# Patient Record
Sex: MALE | Race: WHITE | Employment: OTHER | URBAN - METROPOLITAN AREA
[De-identification: names, ages, dates, MRNs, and addresses within clinical notes are randomized per-mention and may not be internally consistent; named-entity substitution may affect disease eponyms.]

---

## 2017-02-01 RX ORDER — PRAVASTATIN SODIUM 40 MG
40 TABLET ORAL DAILY
Qty: 90 TABLET | Refills: 2 | Status: SHIPPED | OUTPATIENT
Start: 2017-02-01 | End: 2018-11-01

## 2017-02-01 RX ORDER — CLOPIDOGREL BISULFATE 75 MG/1
75 TABLET ORAL DAILY
Qty: 90 TABLET | Refills: 2 | Status: SHIPPED | OUTPATIENT
Start: 2017-02-01 | End: 2017-11-14 | Stop reason: SDUPTHER

## 2017-04-20 RX ORDER — ZOLPIDEM TARTRATE 10 MG/1
TABLET ORAL
OUTPATIENT
Start: 2017-04-20

## 2017-05-01 ENCOUNTER — OFFICE VISIT (OUTPATIENT)
Dept: INTERNAL MEDICINE | Age: 77
End: 2017-05-01
Payer: MEDICARE

## 2017-05-01 VITALS
RESPIRATION RATE: 12 BRPM | HEIGHT: 62 IN | WEIGHT: 161.6 LBS | HEART RATE: 83 BPM | DIASTOLIC BLOOD PRESSURE: 75 MMHG | SYSTOLIC BLOOD PRESSURE: 127 MMHG | OXYGEN SATURATION: 98 % | BODY MASS INDEX: 29.74 KG/M2

## 2017-05-01 DIAGNOSIS — M25.512 ACUTE PAIN OF LEFT SHOULDER: Primary | ICD-10-CM

## 2017-05-01 PROCEDURE — 99213 OFFICE O/P EST LOW 20 MIN: CPT | Performed by: INTERNAL MEDICINE

## 2017-05-01 RX ORDER — HYDROCODONE BITARTRATE AND ACETAMINOPHEN 5; 325 MG/1; MG/1
1 TABLET ORAL EVERY 6 HOURS PRN
Qty: 28 TABLET | Refills: 0 | Status: SHIPPED | OUTPATIENT
Start: 2017-05-01 | End: 2017-05-08

## 2017-05-01 ASSESSMENT — PATIENT HEALTH QUESTIONNAIRE - PHQ9
SUM OF ALL RESPONSES TO PHQ9 QUESTIONS 1 & 2: 0
SUM OF ALL RESPONSES TO PHQ QUESTIONS 1-9: 0
2. FEELING DOWN, DEPRESSED OR HOPELESS: 0
1. LITTLE INTEREST OR PLEASURE IN DOING THINGS: 0

## 2017-05-02 ENCOUNTER — TELEPHONE (OUTPATIENT)
Dept: ORTHOPEDIC SURGERY | Age: 77
End: 2017-05-02

## 2017-05-05 ENCOUNTER — TELEPHONE (OUTPATIENT)
Dept: ORTHOPEDIC SURGERY | Age: 77
End: 2017-05-05

## 2017-05-09 ENCOUNTER — TELEPHONE (OUTPATIENT)
Dept: ORTHOPEDIC SURGERY | Age: 77
End: 2017-05-09

## 2017-06-12 RX ORDER — LOSARTAN POTASSIUM 25 MG/1
TABLET ORAL
Qty: 90 TABLET | Refills: 1 | Status: SHIPPED | OUTPATIENT
Start: 2017-06-12 | End: 2017-12-13 | Stop reason: SDUPTHER

## 2017-06-28 RX ORDER — FUROSEMIDE 40 MG/1
40 TABLET ORAL DAILY
Qty: 30 TABLET | Refills: 0 | Status: SHIPPED | OUTPATIENT
Start: 2017-06-28 | End: 2017-07-31 | Stop reason: SDUPTHER

## 2017-07-11 DIAGNOSIS — M25.512 LEFT SHOULDER PAIN, UNSPECIFIED CHRONICITY: Primary | ICD-10-CM

## 2017-07-14 ENCOUNTER — OFFICE VISIT (OUTPATIENT)
Dept: ORTHOPEDIC SURGERY | Age: 77
End: 2017-07-14
Payer: MEDICARE

## 2017-07-14 VITALS — BODY MASS INDEX: 29.44 KG/M2 | HEIGHT: 62 IN | WEIGHT: 160 LBS

## 2017-07-14 DIAGNOSIS — M75.102 TEAR OF LEFT ROTATOR CUFF, UNSPECIFIED TEAR EXTENT: ICD-10-CM

## 2017-07-14 DIAGNOSIS — M25.512 LEFT SHOULDER PAIN, UNSPECIFIED CHRONICITY: Primary | ICD-10-CM

## 2017-07-14 DIAGNOSIS — M67.432 GANGLION OF WRIST, LEFT: ICD-10-CM

## 2017-07-14 PROCEDURE — 99214 OFFICE O/P EST MOD 30 MIN: CPT | Performed by: ORTHOPAEDIC SURGERY

## 2017-07-14 PROCEDURE — 20610 DRAIN/INJ JOINT/BURSA W/O US: CPT | Performed by: ORTHOPAEDIC SURGERY

## 2017-07-14 RX ORDER — METHYLPREDNISOLONE ACETATE 80 MG/ML
80 INJECTION, SUSPENSION INTRA-ARTICULAR; INTRALESIONAL; INTRAMUSCULAR; SOFT TISSUE ONCE
Status: COMPLETED | OUTPATIENT
Start: 2017-07-14 | End: 2017-07-14

## 2017-07-14 RX ORDER — BUPIVACAINE HYDROCHLORIDE 2.5 MG/ML
2 INJECTION, SOLUTION INFILTRATION; PERINEURAL ONCE
Status: COMPLETED | OUTPATIENT
Start: 2017-07-14 | End: 2017-07-14

## 2017-07-14 RX ADMIN — BUPIVACAINE HYDROCHLORIDE 5 MG: 2.5 INJECTION, SOLUTION INFILTRATION; PERINEURAL at 12:46

## 2017-07-14 RX ADMIN — METHYLPREDNISOLONE ACETATE 80 MG: 80 INJECTION, SUSPENSION INTRA-ARTICULAR; INTRALESIONAL; INTRAMUSCULAR; SOFT TISSUE at 12:47

## 2017-07-14 ASSESSMENT — ENCOUNTER SYMPTOMS
RECTAL PAIN: 0
ABDOMINAL PAIN: 0
ANAL BLEEDING: 0
COLOR CHANGE: 0
VOMITING: 0
CONSTIPATION: 0
BACK PAIN: 0
DIARRHEA: 0
NAUSEA: 0
COUGH: 0
BLOOD IN STOOL: 0
WHEEZING: 0

## 2017-08-01 RX ORDER — FUROSEMIDE 40 MG/1
TABLET ORAL
Qty: 30 TABLET | Refills: 3 | Status: SHIPPED | OUTPATIENT
Start: 2017-08-01 | End: 2017-08-01 | Stop reason: SDUPTHER

## 2017-08-01 RX ORDER — FUROSEMIDE 40 MG/1
TABLET ORAL
Qty: 90 TABLET | Refills: 3 | Status: SHIPPED | OUTPATIENT
Start: 2017-08-01 | End: 2018-09-19 | Stop reason: SDUPTHER

## 2017-11-14 RX ORDER — CLOPIDOGREL BISULFATE 75 MG/1
TABLET ORAL
Qty: 90 TABLET | Refills: 2 | Status: SHIPPED | OUTPATIENT
Start: 2017-11-14 | End: 2018-08-27 | Stop reason: SDUPTHER

## 2017-11-14 NOTE — TELEPHONE ENCOUNTER
Next Visit Date:  No future appointments.     Health Maintenance   Topic Date Due    Lipid screen  07/18/1978    Zostavax vaccine  07/18/1998    Pneumococcal low/med risk (1 of 2 - PCV13) 07/18/2003    Flu vaccine (1) 09/01/2017    DTaP/Tdap/Td vaccine (1 - Tdap) 05/01/2018 (Originally 7/18/1959)       No results found for: LABA1C          ( goal A1C is < 7)   No results found for: LABMICR  No results found for: LDLCHOLESTEROL, LDLCALC    (goal LDL is <100)   BUN (mg/dL)   Date Value   09/27/2016 11     BP Readings from Last 3 Encounters:   05/01/17 127/75   11/03/16 120/80   09/27/16 117/60          (goal 120/80)    All Future Testing planned in [facility]  Lab Frequency Next Occurrence               Patient Active Problem List:     Shoulder pain, left     Mass of right submandibular region     Essential hypertension     Acute pain of left shoulder

## 2017-12-13 RX ORDER — LOSARTAN POTASSIUM 25 MG/1
TABLET ORAL
Qty: 90 TABLET | Refills: 3 | Status: SHIPPED | OUTPATIENT
Start: 2017-12-13 | End: 2018-11-01

## 2018-08-03 ENCOUNTER — OFFICE VISIT (OUTPATIENT)
Dept: INTERNAL MEDICINE | Age: 78
End: 2018-08-03
Payer: MEDICARE

## 2018-08-03 VITALS
TEMPERATURE: 98.3 F | HEART RATE: 49 BPM | WEIGHT: 154 LBS | SYSTOLIC BLOOD PRESSURE: 127 MMHG | DIASTOLIC BLOOD PRESSURE: 65 MMHG | HEIGHT: 62 IN | BODY MASS INDEX: 28.34 KG/M2

## 2018-08-03 DIAGNOSIS — K40.90 INGUINAL HERNIA, LEFT: Primary | ICD-10-CM

## 2018-08-03 PROCEDURE — 99214 OFFICE O/P EST MOD 30 MIN: CPT | Performed by: INTERNAL MEDICINE

## 2018-08-03 ASSESSMENT — PATIENT HEALTH QUESTIONNAIRE - PHQ9
SUM OF ALL RESPONSES TO PHQ9 QUESTIONS 1 & 2: 0
1. LITTLE INTEREST OR PLEASURE IN DOING THINGS: 0
SUM OF ALL RESPONSES TO PHQ QUESTIONS 1-9: 0
2. FEELING DOWN, DEPRESSED OR HOPELESS: 0

## 2018-08-28 RX ORDER — CLOPIDOGREL BISULFATE 75 MG/1
TABLET ORAL
Qty: 90 TABLET | Refills: 2 | Status: SHIPPED | OUTPATIENT
Start: 2018-08-28 | End: 2018-11-01

## 2018-09-11 ENCOUNTER — HOSPITAL ENCOUNTER (EMERGENCY)
Age: 78
Discharge: HOME | End: 2018-09-11
Attending: EMERGENCY MEDICINE
Payer: MEDICARE

## 2018-09-11 ENCOUNTER — APPOINTMENT (OUTPATIENT)
Dept: CT IMAGING | Age: 78
End: 2018-09-11
Payer: MEDICARE

## 2018-09-11 VITALS
BODY MASS INDEX: 27.41 KG/M2 | OXYGEN SATURATION: 96 % | HEART RATE: 90 BPM | WEIGHT: 154.7 LBS | RESPIRATION RATE: 16 BRPM | TEMPERATURE: 97.7 F | SYSTOLIC BLOOD PRESSURE: 141 MMHG | DIASTOLIC BLOOD PRESSURE: 54 MMHG | HEIGHT: 63 IN

## 2018-09-11 DIAGNOSIS — R19.09 MASS OF RIGHT INGUINAL REGION: Primary | ICD-10-CM

## 2018-09-11 LAB
ABSOLUTE EOS #: 0.1 K/UL (ref 0–0.4)
ABSOLUTE IMMATURE GRANULOCYTE: ABNORMAL K/UL (ref 0–0.3)
ABSOLUTE LYMPH #: 1.3 K/UL (ref 1–4.8)
ABSOLUTE MONO #: 0.9 K/UL (ref 0.2–0.8)
ANION GAP SERPL CALCULATED.3IONS-SCNC: 13 MMOL/L (ref 9–17)
BASOPHILS # BLD: 1 % (ref 0–2)
BASOPHILS ABSOLUTE: 0.1 K/UL (ref 0–0.2)
BUN BLDV-MCNC: 12 MG/DL (ref 8–23)
BUN/CREAT BLD: 9 (ref 9–20)
CALCIUM SERPL-MCNC: 9.1 MG/DL (ref 8.6–10.4)
CHLORIDE BLD-SCNC: 98 MMOL/L (ref 98–107)
CO2: 27 MMOL/L (ref 20–31)
CREAT SERPL-MCNC: 1.33 MG/DL (ref 0.7–1.2)
DIFFERENTIAL TYPE: ABNORMAL
EOSINOPHILS RELATIVE PERCENT: 1 % (ref 1–4)
GFR AFRICAN AMERICAN: >60 ML/MIN
GFR NON-AFRICAN AMERICAN: 52 ML/MIN
GFR SERPL CREATININE-BSD FRML MDRD: ABNORMAL ML/MIN/{1.73_M2}
GFR SERPL CREATININE-BSD FRML MDRD: ABNORMAL ML/MIN/{1.73_M2}
GLUCOSE BLD-MCNC: 94 MG/DL (ref 70–99)
HCT VFR BLD CALC: 38.5 % (ref 41–53)
HEMOGLOBIN: 13.5 G/DL (ref 13.5–17.5)
IMMATURE GRANULOCYTES: ABNORMAL %
INR BLD: 1
LYMPHOCYTES # BLD: 13 % (ref 24–44)
MCH RBC QN AUTO: 34.6 PG (ref 26–34)
MCHC RBC AUTO-ENTMCNC: 35.1 G/DL (ref 31–37)
MCV RBC AUTO: 98.6 FL (ref 80–100)
MONOCYTES # BLD: 8 % (ref 1–7)
NRBC AUTOMATED: ABNORMAL PER 100 WBC
PARTIAL THROMBOPLASTIN TIME: 26.8 SEC (ref 23–31)
PDW BLD-RTO: 12.4 % (ref 11.5–14.5)
PLATELET # BLD: 276 K/UL (ref 130–400)
PLATELET ESTIMATE: ABNORMAL
PMV BLD AUTO: ABNORMAL FL (ref 6–12)
POTASSIUM SERPL-SCNC: 3.6 MMOL/L (ref 3.7–5.3)
PROTHROMBIN TIME: 10.3 SEC (ref 9.7–11.6)
RBC # BLD: 3.91 M/UL (ref 4.5–5.9)
RBC # BLD: ABNORMAL 10*6/UL
SEG NEUTROPHILS: 77 % (ref 36–66)
SEGMENTED NEUTROPHILS ABSOLUTE COUNT: 8.1 K/UL (ref 1.8–7.7)
SODIUM BLD-SCNC: 138 MMOL/L (ref 135–144)
WBC # BLD: 10.5 K/UL (ref 3.5–11)
WBC # BLD: ABNORMAL 10*3/UL

## 2018-09-11 PROCEDURE — 80048 BASIC METABOLIC PNL TOTAL CA: CPT

## 2018-09-11 PROCEDURE — 99284 EMERGENCY DEPT VISIT MOD MDM: CPT

## 2018-09-11 PROCEDURE — 85610 PROTHROMBIN TIME: CPT

## 2018-09-11 PROCEDURE — 6360000004 HC RX CONTRAST MEDICATION: Performed by: NURSE PRACTITIONER

## 2018-09-11 PROCEDURE — 74177 CT ABD & PELVIS W/CONTRAST: CPT

## 2018-09-11 PROCEDURE — 2580000003 HC RX 258: Performed by: NURSE PRACTITIONER

## 2018-09-11 PROCEDURE — 85730 THROMBOPLASTIN TIME PARTIAL: CPT

## 2018-09-11 PROCEDURE — 93970 EXTREMITY STUDY: CPT

## 2018-09-11 PROCEDURE — 36415 COLL VENOUS BLD VENIPUNCTURE: CPT

## 2018-09-11 PROCEDURE — 85025 COMPLETE CBC W/AUTO DIFF WBC: CPT

## 2018-09-11 RX ORDER — DOCUSATE SODIUM 100 MG/1
100 CAPSULE, LIQUID FILLED ORAL 2 TIMES DAILY
Qty: 60 CAPSULE | Refills: 0 | Status: SHIPPED | OUTPATIENT
Start: 2018-09-11

## 2018-09-11 RX ORDER — 0.9 % SODIUM CHLORIDE 0.9 %
500 INTRAVENOUS SOLUTION INTRAVENOUS ONCE
Status: COMPLETED | OUTPATIENT
Start: 2018-09-11 | End: 2018-09-11

## 2018-09-11 RX ORDER — HYDROCODONE BITARTRATE AND ACETAMINOPHEN 5; 325 MG/1; MG/1
1 TABLET ORAL EVERY 6 HOURS PRN
Qty: 10 TABLET | Refills: 0 | Status: SHIPPED | OUTPATIENT
Start: 2018-09-11 | End: 2018-09-14

## 2018-09-11 RX ORDER — 0.9 % SODIUM CHLORIDE 0.9 %
80 INTRAVENOUS SOLUTION INTRAVENOUS ONCE
Status: COMPLETED | OUTPATIENT
Start: 2018-09-11 | End: 2018-09-11

## 2018-09-11 RX ORDER — SODIUM CHLORIDE 0.9 % (FLUSH) 0.9 %
10 SYRINGE (ML) INJECTION
Status: COMPLETED | OUTPATIENT
Start: 2018-09-11 | End: 2018-09-11

## 2018-09-11 RX ADMIN — IOPAMIDOL 75 ML: 755 INJECTION, SOLUTION INTRAVENOUS at 17:12

## 2018-09-11 RX ADMIN — SODIUM CHLORIDE 500 ML: 9 INJECTION, SOLUTION INTRAVENOUS at 16:30

## 2018-09-11 RX ADMIN — SODIUM CHLORIDE, PRESERVATIVE FREE 10 ML: 5 INJECTION INTRAVENOUS at 17:13

## 2018-09-11 RX ADMIN — SODIUM CHLORIDE 80 ML: 9 INJECTION, SOLUTION INTRAVENOUS at 17:13

## 2018-09-11 ASSESSMENT — PAIN DESCRIPTION - ORIENTATION: ORIENTATION: LEFT

## 2018-09-11 ASSESSMENT — ENCOUNTER SYMPTOMS
NAUSEA: 0
BACK PAIN: 0
SHORTNESS OF BREATH: 0
ABDOMINAL PAIN: 0
SORE THROAT: 0
CONSTIPATION: 0
COUGH: 0
COLOR CHANGE: 1
WHEEZING: 0
DIARRHEA: 0
VOMITING: 0
EYE PAIN: 0

## 2018-09-11 ASSESSMENT — PAIN DESCRIPTION - LOCATION: LOCATION: GROIN;LEG

## 2018-09-11 ASSESSMENT — PAIN DESCRIPTION - FREQUENCY: FREQUENCY: INTERMITTENT

## 2018-09-11 ASSESSMENT — PAIN DESCRIPTION - DESCRIPTORS: DESCRIPTORS: SHARP

## 2018-09-11 ASSESSMENT — PAIN SCALES - WONG BAKER: WONGBAKER_NUMERICALRESPONSE: 8

## 2018-09-11 ASSESSMENT — PAIN SCALES - GENERAL: PAINLEVEL_OUTOF10: 8

## 2018-09-11 NOTE — ED PROVIDER NOTES
[facility] ED  eMERGENCY dEPARTMENT eNCOUnter      Pt Name: Balbina Hogan  MRN: [id number]  [place] 1940  Date of evaluation: 9/11/2018  Provider: ZAINAB Hinton 6626       Chief Complaint   Patient presents with    Groin Pain     left pulled 3 weeks ago, seen Dr. Flor Ruelas 2 weeks ago    Leg Swelling     left         HISTORY OF PRESENT ILLNESS  (Location/Symptom, Timing/Onset, Context/Setting, Quality, Duration, Modifying Factors, Severity.)   Balbina Hogan is a [de-identified] y.o. male who presents to the emergency department  With complaints of left inguinal pain. He states he was carrying some heavy wood the day before Labor Day and suddenly felt a \"pop\" and had pain in his left leg. The area began to swell and he saw his primary care physician a couple of days later. He was then referred to Dr. Anthony gomez. Dr. Anthony Kc office ordered a CAT scan of the left inguinal area. The patient states he is awaiting approval by his insurance company before getting the CAT scan performed. The patient reports that the mass has grown in size daily. He denies any fever or chills. He came to the emergency department today because he has realized that his entire left leg is now swollen in addition to the mass in his left inguinal area. Nursing Notes were reviewed. ALLERGIES     Patient has no known allergies.     CURRENT MEDICATIONS       Discharge Medication List as of 9/11/2018  6:47 PM      CONTINUE these medications which have NOT CHANGED    Details   clopidogrel (PLAVIX) 75 MG tablet TAKE ONE TABLET BY MOUTH ONCE DAILY, Disp-90 tablet, R-2Normal      losartan (COZAAR) 25 MG tablet TAKE ONE TABLET BY MOUTH ONCE DAILY, Disp-90 tablet, R-3Normal      furosemide (LASIX) 40 MG tablet TAKE ONE TABLET BY MOUTH ONCE DAILY, Disp-90 tablet, R-3Normal      pravastatin (PRAVACHOL) 40 MG tablet Take 1 tablet by mouth daily, Disp-90 tablet, R-2Normal      lisinopril (PRINIVIL;ZESTRIL) 5 MG tablet reviewed with her as well. CONSULTS:  None    PROCEDURES:  None    FINAL IMPRESSION      1. Mass of right inguinal region          DISPOSITION/PLAN   DISPOSITION Decision To Discharge 09/11/2018 06:40:12 PM      PATIENT REFERRED TO:   Linnea Hyde MD  [address] [place] [address]  [phone]    Schedule an appointment as soon as possible for a visit in 1 week      Shala Stack MD  [address] [facility]  [phone]    Call in 1 day        DISCHARGE MEDICATIONS:     Discharge Medication List as of 9/11/2018  6:47 PM      START taking these medications    Details   HYDROcodone-acetaminophen (NORCO) 5-325 MG per tablet Take 1 tablet by mouth every 6 hours as needed for Pain for up to 3 days. Intended supply: 3 days.  Take lowest dose possible to manage pain., Disp-10 tablet, R-0Print      docusate sodium (COLACE) 100 MG capsule Take 1 capsule by mouth 2 times daily, Disp-60 capsule, R-0Print                 (Please note that portions of this note were completed with a voice recognition program.  Efforts were made to edit the dictations but occasionally words are mis-transcribed.)    ZAINAB Vega - CNP  Certified Nurse Practitioner           ZAINAB Vega CNP  09/11/18 [id number]

## 2018-09-19 RX ORDER — FUROSEMIDE 40 MG/1
TABLET ORAL
Qty: 90 TABLET | Refills: 3 | Status: SHIPPED | OUTPATIENT
Start: 2018-09-19

## 2018-11-01 ENCOUNTER — APPOINTMENT (OUTPATIENT)
Dept: GENERAL RADIOLOGY | Age: 78
DRG: 303 | End: 2018-11-01
Payer: MEDICARE

## 2018-11-01 ENCOUNTER — HOSPITAL ENCOUNTER (INPATIENT)
Age: 78
LOS: 3 days | Discharge: HOME HEALTH | DRG: 303 | End: 2018-11-04
Attending: EMERGENCY MEDICINE | Admitting: INTERNAL MEDICINE
Payer: MEDICARE

## 2018-11-01 DIAGNOSIS — R06.02 SHORTNESS OF BREATH: Primary | ICD-10-CM

## 2018-11-01 PROBLEM — R07.9 CHEST PAIN: Status: ACTIVE | Noted: 2018-11-01

## 2018-11-01 LAB
ANION GAP SERPL CALCULATED.3IONS-SCNC: 22 MMOL/L (ref 9–17)
BNP INTERPRETATION: ABNORMAL
BUN BLDV-MCNC: 30 MG/DL (ref 8–23)
BUN/CREAT BLD: 18 (ref 9–20)
CALCIUM SERPL-MCNC: 8.9 MG/DL (ref 8.6–10.4)
CHLORIDE BLD-SCNC: 93 MMOL/L (ref 98–107)
CO2: 20 MMOL/L (ref 20–31)
CREAT SERPL-MCNC: 1.68 MG/DL (ref 0.7–1.2)
GFR AFRICAN AMERICAN: 48 ML/MIN
GFR NON-AFRICAN AMERICAN: 40 ML/MIN
GFR SERPL CREATININE-BSD FRML MDRD: ABNORMAL ML/MIN/{1.73_M2}
GFR SERPL CREATININE-BSD FRML MDRD: ABNORMAL ML/MIN/{1.73_M2}
GLUCOSE BLD-MCNC: 215 MG/DL (ref 70–99)
MYOGLOBIN: 66 NG/ML (ref 28–72)
POTASSIUM SERPL-SCNC: 3.4 MMOL/L (ref 3.7–5.3)
PRO-BNP: 2332 PG/ML
SODIUM BLD-SCNC: 135 MMOL/L (ref 135–144)
TROPONIN INTERP: NORMAL
TROPONIN T: <0.03 NG/ML

## 2018-11-01 PROCEDURE — 93005 ELECTROCARDIOGRAM TRACING: CPT

## 2018-11-01 PROCEDURE — 6360000002 HC RX W HCPCS: Performed by: NURSE PRACTITIONER

## 2018-11-01 PROCEDURE — 83880 ASSAY OF NATRIURETIC PEPTIDE: CPT

## 2018-11-01 PROCEDURE — 2060000000 HC ICU INTERMEDIATE R&B

## 2018-11-01 PROCEDURE — 83874 ASSAY OF MYOGLOBIN: CPT

## 2018-11-01 PROCEDURE — 96374 THER/PROPH/DIAG INJ IV PUSH: CPT

## 2018-11-01 PROCEDURE — 85025 COMPLETE CBC W/AUTO DIFF WBC: CPT

## 2018-11-01 PROCEDURE — 36415 COLL VENOUS BLD VENIPUNCTURE: CPT

## 2018-11-01 PROCEDURE — 84484 ASSAY OF TROPONIN QUANT: CPT

## 2018-11-01 PROCEDURE — 80048 BASIC METABOLIC PNL TOTAL CA: CPT

## 2018-11-01 PROCEDURE — 6370000000 HC RX 637 (ALT 250 FOR IP): Performed by: NURSE PRACTITIONER

## 2018-11-01 PROCEDURE — 85379 FIBRIN DEGRADATION QUANT: CPT

## 2018-11-01 PROCEDURE — 99285 EMERGENCY DEPT VISIT HI MDM: CPT

## 2018-11-01 PROCEDURE — 94640 AIRWAY INHALATION TREATMENT: CPT

## 2018-11-01 PROCEDURE — 71045 X-RAY EXAM CHEST 1 VIEW: CPT

## 2018-11-01 RX ORDER — AMOXICILLIN 250 MG
1-2 CAPSULE ORAL 2 TIMES DAILY PRN
COMMUNITY

## 2018-11-01 RX ORDER — ALBUTEROL SULFATE 90 UG/1
2 AEROSOL, METERED RESPIRATORY (INHALATION)
Status: DISCONTINUED | OUTPATIENT
Start: 2018-11-01 | End: 2018-11-02

## 2018-11-01 RX ORDER — OMEPRAZOLE 20 MG/1
20 CAPSULE, DELAYED RELEASE ORAL DAILY
COMMUNITY

## 2018-11-01 RX ORDER — ACYCLOVIR 400 MG/1
400 TABLET ORAL 2 TIMES DAILY
COMMUNITY

## 2018-11-01 RX ORDER — ALBUTEROL SULFATE 2.5 MG/3ML
5 SOLUTION RESPIRATORY (INHALATION)
Status: DISCONTINUED | OUTPATIENT
Start: 2018-11-01 | End: 2018-11-02

## 2018-11-01 RX ORDER — ALLOPURINOL 300 MG/1
300 TABLET ORAL DAILY
COMMUNITY

## 2018-11-01 RX ORDER — FUROSEMIDE 10 MG/ML
20 INJECTION INTRAMUSCULAR; INTRAVENOUS ONCE
Status: COMPLETED | OUTPATIENT
Start: 2018-11-01 | End: 2018-11-01

## 2018-11-01 RX ORDER — OXYCODONE HYDROCHLORIDE 5 MG/1
10-15 TABLET ORAL EVERY 4 HOURS PRN
COMMUNITY

## 2018-11-01 RX ORDER — GABAPENTIN 300 MG/1
300 CAPSULE ORAL 2 TIMES DAILY
COMMUNITY

## 2018-11-01 RX ORDER — PROCHLORPERAZINE MALEATE 10 MG
10 TABLET ORAL EVERY 6 HOURS PRN
COMMUNITY

## 2018-11-01 RX ORDER — DAPSONE 100 MG/1
100 TABLET ORAL DAILY
COMMUNITY

## 2018-11-01 RX ORDER — FAMOTIDINE 20 MG/1
20 TABLET, FILM COATED ORAL 2 TIMES DAILY
COMMUNITY

## 2018-11-01 RX ORDER — IBUPROFEN 600 MG/1
600 TABLET ORAL 3 TIMES DAILY
COMMUNITY

## 2018-11-01 RX ORDER — IPRATROPIUM BROMIDE AND ALBUTEROL SULFATE 2.5; .5 MG/3ML; MG/3ML
1 SOLUTION RESPIRATORY (INHALATION)
Status: DISCONTINUED | OUTPATIENT
Start: 2018-11-01 | End: 2018-11-02

## 2018-11-01 RX ORDER — PREDNISONE 50 MG/1
50 TABLET ORAL 2 TIMES DAILY
COMMUNITY

## 2018-11-01 RX ADMIN — FUROSEMIDE 20 MG: 10 INJECTION, SOLUTION INTRAMUSCULAR; INTRAVENOUS at 23:08

## 2018-11-01 RX ADMIN — IPRATROPIUM BROMIDE AND ALBUTEROL SULFATE 1 AMPULE: .5; 3 SOLUTION RESPIRATORY (INHALATION) at 23:31

## 2018-11-01 ASSESSMENT — PAIN DESCRIPTION - LOCATION: LOCATION: CHEST

## 2018-11-01 ASSESSMENT — PAIN SCALES - GENERAL: PAINLEVEL_OUTOF10: 5

## 2018-11-02 ENCOUNTER — APPOINTMENT (OUTPATIENT)
Dept: NUCLEAR MEDICINE | Age: 78
DRG: 303 | End: 2018-11-02
Payer: MEDICARE

## 2018-11-02 PROBLEM — D72.823 LEUKEMOID REACTION: Status: ACTIVE | Noted: 2018-11-02

## 2018-11-02 PROBLEM — F41.1 GENERALIZED ANXIETY DISORDER: Status: ACTIVE | Noted: 2018-11-02

## 2018-11-02 PROBLEM — N17.9 AKI (ACUTE KIDNEY INJURY) (HCC): Status: ACTIVE | Noted: 2018-11-02

## 2018-11-02 LAB
ABSOLUTE EOS #: 0 K/UL (ref 0–0.4)
ABSOLUTE IMMATURE GRANULOCYTE: ABNORMAL K/UL (ref 0–0.3)
ABSOLUTE LYMPH #: 0 K/UL (ref 1–4.8)
ABSOLUTE MONO #: 2.5 K/UL (ref 0.2–0.8)
ALBUMIN SERPL-MCNC: 3.3 G/DL (ref 3.5–5.2)
ALBUMIN/GLOBULIN RATIO: ABNORMAL (ref 1–2.5)
ALP BLD-CCNC: 70 U/L (ref 40–129)
ALT SERPL-CCNC: 10 U/L (ref 5–41)
ANION GAP SERPL CALCULATED.3IONS-SCNC: 20 MMOL/L (ref 9–17)
AST SERPL-CCNC: 19 U/L
BASOPHILS # BLD: 0 %
BASOPHILS ABSOLUTE: 0 K/UL (ref 0–0.2)
BILIRUB SERPL-MCNC: 0.67 MG/DL (ref 0.3–1.2)
BUN BLDV-MCNC: 29 MG/DL (ref 8–23)
BUN/CREAT BLD: 17 (ref 9–20)
CALCIUM SERPL-MCNC: 8.6 MG/DL (ref 8.6–10.4)
CHLORIDE BLD-SCNC: 98 MMOL/L (ref 98–107)
CHOLESTEROL/HDL RATIO: 4.3
CHOLESTEROL: 150 MG/DL
CO2: 21 MMOL/L (ref 20–31)
CREAT SERPL-MCNC: 1.66 MG/DL (ref 0.7–1.2)
D-DIMER QUANTITATIVE: 2 MG/L FEU
DIFFERENTIAL TYPE: ABNORMAL
EKG ATRIAL RATE: 106 BPM
EKG ATRIAL RATE: 95 BPM
EKG P AXIS: 44 DEGREES
EKG P AXIS: 62 DEGREES
EKG P-R INTERVAL: 196 MS
EKG P-R INTERVAL: 204 MS
EKG Q-T INTERVAL: 346 MS
EKG Q-T INTERVAL: 382 MS
EKG QRS DURATION: 104 MS
EKG QRS DURATION: 114 MS
EKG QTC CALCULATION (BAZETT): 459 MS
EKG QTC CALCULATION (BAZETT): 480 MS
EKG R AXIS: 17 DEGREES
EKG R AXIS: 40 DEGREES
EKG T AXIS: -2 DEGREES
EKG T AXIS: -8 DEGREES
EKG VENTRICULAR RATE: 106 BPM
EKG VENTRICULAR RATE: 95 BPM
EOSINOPHILS RELATIVE PERCENT: 0 % (ref 1–4)
GFR AFRICAN AMERICAN: 49 ML/MIN
GFR NON-AFRICAN AMERICAN: 40 ML/MIN
GFR SERPL CREATININE-BSD FRML MDRD: ABNORMAL ML/MIN/{1.73_M2}
GFR SERPL CREATININE-BSD FRML MDRD: ABNORMAL ML/MIN/{1.73_M2}
GLUCOSE BLD-MCNC: 148 MG/DL (ref 70–99)
HCT VFR BLD CALC: 26.8 % (ref 41–53)
HCT VFR BLD CALC: 30.7 % (ref 41–53)
HDLC SERPL-MCNC: 35 MG/DL
HEMOGLOBIN: 10.5 G/DL (ref 13.5–17.5)
HEMOGLOBIN: 9 G/DL (ref 13.5–17.5)
IMMATURE GRANULOCYTES: ABNORMAL %
LDL CHOLESTEROL: 91 MG/DL (ref 0–130)
LV EF: 50 %
LVEF MODALITY: NORMAL
LYMPHOCYTES # BLD: 0 % (ref 24–44)
MAGNESIUM: 2 MG/DL (ref 1.6–2.6)
MCH RBC QN AUTO: 34.1 PG (ref 26–34)
MCH RBC QN AUTO: 34.2 PG (ref 26–34)
MCHC RBC AUTO-ENTMCNC: 33.7 G/DL (ref 31–37)
MCHC RBC AUTO-ENTMCNC: 34.1 G/DL (ref 31–37)
MCV RBC AUTO: 100.2 FL (ref 80–100)
MCV RBC AUTO: 101.2 FL (ref 80–100)
MONOCYTES # BLD: 5 % (ref 1–7)
NRBC AUTOMATED: ABNORMAL PER 100 WBC
NRBC AUTOMATED: ABNORMAL PER 100 WBC
PDW BLD-RTO: 15 % (ref 11.5–14.5)
PDW BLD-RTO: 16.7 % (ref 11.5–14.5)
PLATELET # BLD: 369 K/UL (ref 130–400)
PLATELET # BLD: 482 K/UL (ref 130–400)
PLATELET ESTIMATE: ABNORMAL
PMV BLD AUTO: 9.1 FL (ref 6–12)
PMV BLD AUTO: ABNORMAL FL (ref 6–12)
POTASSIUM SERPL-SCNC: 3.6 MMOL/L (ref 3.7–5.3)
RBC # BLD: 2.65 M/UL (ref 4.5–5.9)
RBC # BLD: 3.06 M/UL (ref 4.5–5.9)
RBC # BLD: ABNORMAL 10*6/UL
SEG NEUTROPHILS: 95 % (ref 36–66)
SEGMENTED NEUTROPHILS ABSOLUTE COUNT: 47.5 K/UL (ref 1.8–7.7)
SODIUM BLD-SCNC: 139 MMOL/L (ref 135–144)
TOTAL PROTEIN: 5.4 G/DL (ref 6.4–8.3)
TRIGL SERPL-MCNC: 118 MG/DL
TROPONIN INTERP: NORMAL
TROPONIN INTERP: NORMAL
TROPONIN T: <0.03 NG/ML
TROPONIN T: <0.03 NG/ML
VLDLC SERPL CALC-MCNC: ABNORMAL MG/DL (ref 1–30)
WBC # BLD: 50 K/UL (ref 3.5–11)
WBC # BLD: 55.8 K/UL (ref 3.5–11)
WBC # BLD: ABNORMAL 10*3/UL

## 2018-11-02 PROCEDURE — 6360000002 HC RX W HCPCS: Performed by: NURSE PRACTITIONER

## 2018-11-02 PROCEDURE — 80053 COMPREHEN METABOLIC PANEL: CPT

## 2018-11-02 PROCEDURE — 94760 N-INVAS EAR/PLS OXIMETRY 1: CPT

## 2018-11-02 PROCEDURE — 2580000003 HC RX 258: Performed by: NURSE PRACTITIONER

## 2018-11-02 PROCEDURE — 99223 1ST HOSP IP/OBS HIGH 75: CPT | Performed by: INTERNAL MEDICINE

## 2018-11-02 PROCEDURE — A9540 TC99M MAA: HCPCS | Performed by: NURSE PRACTITIONER

## 2018-11-02 PROCEDURE — 78582 LUNG VENTILAT&PERFUS IMAGING: CPT

## 2018-11-02 PROCEDURE — 6370000000 HC RX 637 (ALT 250 FOR IP): Performed by: INTERNAL MEDICINE

## 2018-11-02 PROCEDURE — 93005 ELECTROCARDIOGRAM TRACING: CPT

## 2018-11-02 PROCEDURE — 36415 COLL VENOUS BLD VENIPUNCTURE: CPT

## 2018-11-02 PROCEDURE — 80061 LIPID PANEL: CPT

## 2018-11-02 PROCEDURE — A9538 TC99M PYROPHOSPHATE: HCPCS | Performed by: NURSE PRACTITIONER

## 2018-11-02 PROCEDURE — 2060000000 HC ICU INTERMEDIATE R&B

## 2018-11-02 PROCEDURE — 93306 TTE W/DOPPLER COMPLETE: CPT

## 2018-11-02 PROCEDURE — 84484 ASSAY OF TROPONIN QUANT: CPT

## 2018-11-02 PROCEDURE — 6370000000 HC RX 637 (ALT 250 FOR IP)

## 2018-11-02 PROCEDURE — 6370000000 HC RX 637 (ALT 250 FOR IP): Performed by: NURSE PRACTITIONER

## 2018-11-02 PROCEDURE — 2700000000 HC OXYGEN THERAPY PER DAY

## 2018-11-02 PROCEDURE — 93970 EXTREMITY STUDY: CPT

## 2018-11-02 PROCEDURE — 83735 ASSAY OF MAGNESIUM: CPT

## 2018-11-02 PROCEDURE — 85027 COMPLETE CBC AUTOMATED: CPT

## 2018-11-02 PROCEDURE — 3430000000 HC RX DIAGNOSTIC RADIOPHARMACEUTICAL: Performed by: NURSE PRACTITIONER

## 2018-11-02 PROCEDURE — 94640 AIRWAY INHALATION TREATMENT: CPT

## 2018-11-02 PROCEDURE — 94664 DEMO&/EVAL PT USE INHALER: CPT

## 2018-11-02 RX ORDER — POTASSIUM CHLORIDE 20MEQ/15ML
40 LIQUID (ML) ORAL PRN
Status: DISCONTINUED | OUTPATIENT
Start: 2018-11-02 | End: 2018-11-04 | Stop reason: HOSPADM

## 2018-11-02 RX ORDER — ONDANSETRON 2 MG/ML
4 INJECTION INTRAMUSCULAR; INTRAVENOUS EVERY 6 HOURS PRN
Status: DISCONTINUED | OUTPATIENT
Start: 2018-11-02 | End: 2018-11-04 | Stop reason: HOSPADM

## 2018-11-02 RX ORDER — LORATADINE 10 MG/1
10 TABLET ORAL DAILY
Status: COMPLETED | OUTPATIENT
Start: 2018-11-02 | End: 2018-11-03

## 2018-11-02 RX ORDER — POTASSIUM CHLORIDE 7.45 MG/ML
10 INJECTION INTRAVENOUS PRN
Status: DISCONTINUED | OUTPATIENT
Start: 2018-11-02 | End: 2018-11-04 | Stop reason: HOSPADM

## 2018-11-02 RX ORDER — DAPSONE 100 MG/1
100 TABLET ORAL DAILY
Status: DISCONTINUED | OUTPATIENT
Start: 2018-11-02 | End: 2018-11-04 | Stop reason: HOSPADM

## 2018-11-02 RX ORDER — DOCUSATE SODIUM 100 MG/1
100 CAPSULE, LIQUID FILLED ORAL 2 TIMES DAILY
Status: DISCONTINUED | OUTPATIENT
Start: 2018-11-02 | End: 2018-11-04 | Stop reason: HOSPADM

## 2018-11-02 RX ORDER — PREDNISONE 20 MG/1
50 TABLET ORAL 2 TIMES DAILY
Status: DISCONTINUED | OUTPATIENT
Start: 2018-11-02 | End: 2018-11-04 | Stop reason: HOSPADM

## 2018-11-02 RX ORDER — NITROGLYCERIN 0.4 MG/1
TABLET SUBLINGUAL
Status: COMPLETED
Start: 2018-11-02 | End: 2018-11-02

## 2018-11-02 RX ORDER — FAMOTIDINE 20 MG/1
20 TABLET, FILM COATED ORAL 2 TIMES DAILY
Status: DISCONTINUED | OUTPATIENT
Start: 2018-11-02 | End: 2018-11-04 | Stop reason: HOSPADM

## 2018-11-02 RX ORDER — 0.9 % SODIUM CHLORIDE 0.9 %
250 INTRAVENOUS SOLUTION INTRAVENOUS ONCE
Status: DISCONTINUED | OUTPATIENT
Start: 2018-11-02 | End: 2018-11-02

## 2018-11-02 RX ORDER — POTASSIUM CHLORIDE 20 MEQ/1
20 TABLET, EXTENDED RELEASE ORAL DAILY
COMMUNITY

## 2018-11-02 RX ORDER — SODIUM CHLORIDE 0.9 % (FLUSH) 0.9 %
10 SYRINGE (ML) INJECTION PRN
Status: DISCONTINUED | OUTPATIENT
Start: 2018-11-02 | End: 2018-11-02

## 2018-11-02 RX ORDER — POTASSIUM CHLORIDE 20 MEQ/1
40 TABLET, EXTENDED RELEASE ORAL PRN
Status: DISCONTINUED | OUTPATIENT
Start: 2018-11-02 | End: 2018-11-04 | Stop reason: HOSPADM

## 2018-11-02 RX ORDER — POTASSIUM CHLORIDE 7.45 MG/ML
10 INJECTION INTRAVENOUS PRN
Status: DISCONTINUED | OUTPATIENT
Start: 2018-11-02 | End: 2018-11-02

## 2018-11-02 RX ORDER — METOPROLOL TARTRATE 5 MG/5ML
2.5 INJECTION INTRAVENOUS PRN
Status: DISCONTINUED | OUTPATIENT
Start: 2018-11-02 | End: 2018-11-02

## 2018-11-02 RX ORDER — SODIUM CHLORIDE 0.9 % (FLUSH) 0.9 %
10 SYRINGE (ML) INJECTION EVERY 12 HOURS SCHEDULED
Status: DISCONTINUED | OUTPATIENT
Start: 2018-11-02 | End: 2018-11-04 | Stop reason: HOSPADM

## 2018-11-02 RX ORDER — PANTOPRAZOLE SODIUM 40 MG/1
40 TABLET, DELAYED RELEASE ORAL
Status: DISCONTINUED | OUTPATIENT
Start: 2018-11-02 | End: 2018-11-04 | Stop reason: HOSPADM

## 2018-11-02 RX ORDER — ACYCLOVIR 200 MG/1
400 CAPSULE ORAL 2 TIMES DAILY
Status: DISCONTINUED | OUTPATIENT
Start: 2018-11-02 | End: 2018-11-04 | Stop reason: HOSPADM

## 2018-11-02 RX ORDER — BISACODYL 10 MG
10 SUPPOSITORY, RECTAL RECTAL DAILY PRN
Status: DISCONTINUED | OUTPATIENT
Start: 2018-11-02 | End: 2018-11-04 | Stop reason: HOSPADM

## 2018-11-02 RX ORDER — ATORVASTATIN CALCIUM 40 MG/1
40 TABLET, FILM COATED ORAL NIGHTLY
Status: DISCONTINUED | OUTPATIENT
Start: 2018-11-02 | End: 2018-11-04 | Stop reason: HOSPADM

## 2018-11-02 RX ORDER — SODIUM CHLORIDE 0.9 % (FLUSH) 0.9 %
10 SYRINGE (ML) INJECTION PRN
Status: DISCONTINUED | OUTPATIENT
Start: 2018-11-02 | End: 2018-11-04 | Stop reason: HOSPADM

## 2018-11-02 RX ORDER — HEPARIN SODIUM 5000 [USP'U]/ML
5000 INJECTION, SOLUTION INTRAVENOUS; SUBCUTANEOUS EVERY 8 HOURS SCHEDULED
Status: DISCONTINUED | OUTPATIENT
Start: 2018-11-02 | End: 2018-11-04 | Stop reason: HOSPADM

## 2018-11-02 RX ORDER — ONDANSETRON 4 MG/1
4 TABLET, ORALLY DISINTEGRATING ORAL EVERY 6 HOURS PRN
Status: DISCONTINUED | OUTPATIENT
Start: 2018-11-02 | End: 2018-11-04 | Stop reason: HOSPADM

## 2018-11-02 RX ORDER — NITROGLYCERIN 0.4 MG/1
0.4 TABLET SUBLINGUAL EVERY 5 MIN PRN
Status: DISCONTINUED | OUTPATIENT
Start: 2018-11-02 | End: 2018-11-02

## 2018-11-02 RX ORDER — GABAPENTIN 300 MG/1
300 CAPSULE ORAL 2 TIMES DAILY
Status: DISCONTINUED | OUTPATIENT
Start: 2018-11-02 | End: 2018-11-04 | Stop reason: HOSPADM

## 2018-11-02 RX ORDER — MORPHINE SULFATE 2 MG/ML
2 INJECTION, SOLUTION INTRAMUSCULAR; INTRAVENOUS ONCE
Status: COMPLETED | OUTPATIENT
Start: 2018-11-02 | End: 2018-11-02

## 2018-11-02 RX ORDER — IBUPROFEN 200 MG
600 TABLET ORAL 3 TIMES DAILY
Status: COMPLETED | OUTPATIENT
Start: 2018-11-02 | End: 2018-11-04

## 2018-11-02 RX ORDER — ALLOPURINOL 300 MG/1
300 TABLET ORAL DAILY
Status: DISCONTINUED | OUTPATIENT
Start: 2018-11-02 | End: 2018-11-04 | Stop reason: HOSPADM

## 2018-11-02 RX ORDER — ACYCLOVIR 800 MG/1
400 TABLET ORAL 2 TIMES DAILY
Status: DISCONTINUED | OUTPATIENT
Start: 2018-11-02 | End: 2018-11-02 | Stop reason: CLARIF

## 2018-11-02 RX ORDER — ONDANSETRON 2 MG/ML
4 INJECTION INTRAMUSCULAR; INTRAVENOUS EVERY 6 HOURS PRN
Status: DISCONTINUED | OUTPATIENT
Start: 2018-11-02 | End: 2018-11-02

## 2018-11-02 RX ORDER — ACETAMINOPHEN 325 MG/1
650 TABLET ORAL EVERY 4 HOURS PRN
Status: DISCONTINUED | OUTPATIENT
Start: 2018-11-02 | End: 2018-11-04 | Stop reason: HOSPADM

## 2018-11-02 RX ORDER — ALBUTEROL SULFATE 2.5 MG/3ML
2.5 SOLUTION RESPIRATORY (INHALATION)
Status: DISCONTINUED | OUTPATIENT
Start: 2018-11-02 | End: 2018-11-02

## 2018-11-02 RX ORDER — ALBUTEROL SULFATE 2.5 MG/3ML
2.5 SOLUTION RESPIRATORY (INHALATION) EVERY 6 HOURS PRN
Status: DISCONTINUED | OUTPATIENT
Start: 2018-11-02 | End: 2018-11-04 | Stop reason: HOSPADM

## 2018-11-02 RX ORDER — MAGNESIUM SULFATE 1 G/100ML
1 INJECTION INTRAVENOUS PRN
Status: DISCONTINUED | OUTPATIENT
Start: 2018-11-02 | End: 2018-11-04 | Stop reason: HOSPADM

## 2018-11-02 RX ORDER — DOCUSATE SODIUM 100 MG/1
100 CAPSULE, LIQUID FILLED ORAL 2 TIMES DAILY
Status: DISCONTINUED | OUTPATIENT
Start: 2018-11-02 | End: 2018-11-02

## 2018-11-02 RX ORDER — NICOTINE 21 MG/24HR
1 PATCH, TRANSDERMAL 24 HOURS TRANSDERMAL DAILY
Status: DISCONTINUED | OUTPATIENT
Start: 2018-11-02 | End: 2018-11-02

## 2018-11-02 RX ORDER — HEPARIN SODIUM 5000 [USP'U]/ML
5000 INJECTION, SOLUTION INTRAVENOUS; SUBCUTANEOUS EVERY 8 HOURS SCHEDULED
Status: DISCONTINUED | OUTPATIENT
Start: 2018-11-02 | End: 2018-11-02

## 2018-11-02 RX ORDER — AMINOPHYLLINE DIHYDRATE 25 MG/ML
100 INJECTION, SOLUTION INTRAVENOUS
Status: DISCONTINUED | OUTPATIENT
Start: 2018-11-02 | End: 2018-11-02

## 2018-11-02 RX ORDER — NITROGLYCERIN 0.4 MG/1
0.4 TABLET SUBLINGUAL EVERY 5 MIN PRN
Status: COMPLETED | OUTPATIENT
Start: 2018-11-02 | End: 2018-11-03

## 2018-11-02 RX ORDER — ALPRAZOLAM 0.5 MG/1
0.5 TABLET ORAL 2 TIMES DAILY
Status: DISCONTINUED | OUTPATIENT
Start: 2018-11-02 | End: 2018-11-04 | Stop reason: HOSPADM

## 2018-11-02 RX ORDER — ALBUTEROL SULFATE 90 UG/1
2 AEROSOL, METERED RESPIRATORY (INHALATION) EVERY 6 HOURS PRN
COMMUNITY

## 2018-11-02 RX ORDER — LORAZEPAM 2 MG/ML
0.5 INJECTION INTRAMUSCULAR ONCE
Status: COMPLETED | OUTPATIENT
Start: 2018-11-02 | End: 2018-11-02

## 2018-11-02 RX ADMIN — PREDNISONE 50 MG: 20 TABLET ORAL at 02:24

## 2018-11-02 RX ADMIN — ALPRAZOLAM 0.5 MG: 0.5 TABLET ORAL at 09:24

## 2018-11-02 RX ADMIN — IBUPROFEN 600 MG: 200 TABLET, FILM COATED ORAL at 21:27

## 2018-11-02 RX ADMIN — POTASSIUM CHLORIDE 40 MEQ: 20 TABLET, EXTENDED RELEASE ORAL at 02:25

## 2018-11-02 RX ADMIN — Medication 10 ML: at 21:29

## 2018-11-02 RX ADMIN — HEPARIN SODIUM 5000 UNITS: 5000 INJECTION INTRAVENOUS; SUBCUTANEOUS at 02:33

## 2018-11-02 RX ADMIN — ATORVASTATIN CALCIUM 40 MG: 40 TABLET, FILM COATED ORAL at 21:29

## 2018-11-02 RX ADMIN — Medication: at 00:14

## 2018-11-02 RX ADMIN — NITROGLYCERIN 0.4 MG: 0.4 TABLET SUBLINGUAL at 03:39

## 2018-11-02 RX ADMIN — FAMOTIDINE 20 MG: 20 TABLET ORAL at 21:28

## 2018-11-02 RX ADMIN — MAGNESIUM HYDROXIDE 30 ML: 400 SUSPENSION ORAL at 15:30

## 2018-11-02 RX ADMIN — ATORVASTATIN CALCIUM 40 MG: 40 TABLET, FILM COATED ORAL at 02:25

## 2018-11-02 RX ADMIN — LORAZEPAM 0.5 MG: 2 INJECTION INTRAMUSCULAR; INTRAVENOUS at 02:52

## 2018-11-02 RX ADMIN — GABAPENTIN 300 MG: 300 CAPSULE ORAL at 08:26

## 2018-11-02 RX ADMIN — Medication 30 MILLICURIE: at 04:05

## 2018-11-02 RX ADMIN — PANTOPRAZOLE SODIUM 40 MG: 40 TABLET, DELAYED RELEASE ORAL at 08:26

## 2018-11-02 RX ADMIN — DOCUSATE SODIUM 100 MG: 100 CAPSULE, LIQUID FILLED ORAL at 21:28

## 2018-11-02 RX ADMIN — ACYCLOVIR 400 MG: 800 TABLET ORAL at 02:25

## 2018-11-02 RX ADMIN — GABAPENTIN 300 MG: 300 CAPSULE ORAL at 02:25

## 2018-11-02 RX ADMIN — HEPARIN SODIUM 5000 UNITS: 5000 INJECTION INTRAVENOUS; SUBCUTANEOUS at 21:28

## 2018-11-02 RX ADMIN — ACYCLOVIR 400 MG: 200 CAPSULE ORAL at 21:28

## 2018-11-02 RX ADMIN — MORPHINE SULFATE 2 MG: 2 INJECTION, SOLUTION INTRAMUSCULAR; INTRAVENOUS at 03:44

## 2018-11-02 RX ADMIN — DAPSONE 100 MG: 100 TABLET ORAL at 08:27

## 2018-11-02 RX ADMIN — PREDNISONE 50 MG: 20 TABLET ORAL at 21:29

## 2018-11-02 RX ADMIN — GABAPENTIN 300 MG: 300 CAPSULE ORAL at 21:29

## 2018-11-02 RX ADMIN — HEPARIN SODIUM 5000 UNITS: 5000 INJECTION INTRAVENOUS; SUBCUTANEOUS at 14:58

## 2018-11-02 RX ADMIN — PREDNISONE 50 MG: 20 TABLET ORAL at 08:26

## 2018-11-02 RX ADMIN — ACYCLOVIR 400 MG: 800 TABLET ORAL at 08:27

## 2018-11-02 RX ADMIN — Medication 10 ML: at 08:28

## 2018-11-02 RX ADMIN — ALLOPURINOL 300 MG: 300 TABLET ORAL at 08:26

## 2018-11-02 RX ADMIN — DOCUSATE SODIUM 100 MG: 100 CAPSULE, LIQUID FILLED ORAL at 08:26

## 2018-11-02 RX ADMIN — ACETAMINOPHEN 650 MG: 325 TABLET ORAL at 09:36

## 2018-11-02 RX ADMIN — Medication 6.7 MILLICURIE: at 04:20

## 2018-11-02 RX ADMIN — IBUPROFEN 600 MG: 200 TABLET, FILM COATED ORAL at 17:03

## 2018-11-02 RX ADMIN — ALPRAZOLAM 0.5 MG: 0.5 TABLET ORAL at 21:02

## 2018-11-02 RX ADMIN — MOMETASONE FUROATE AND FORMOTEROL FUMARATE DIHYDRATE 2 PUFF: 200; 5 AEROSOL RESPIRATORY (INHALATION) at 09:22

## 2018-11-02 RX ADMIN — FAMOTIDINE 20 MG: 20 TABLET ORAL at 08:27

## 2018-11-02 RX ADMIN — LORATADINE 10 MG: 10 TABLET ORAL at 17:03

## 2018-11-02 RX ADMIN — TIOTROPIUM BROMIDE 18 MCG: 18 CAPSULE ORAL; RESPIRATORY (INHALATION) at 09:22

## 2018-11-02 RX ADMIN — FAMOTIDINE 20 MG: 20 TABLET ORAL at 02:25

## 2018-11-02 ASSESSMENT — PAIN SCALES - GENERAL
PAINLEVEL_OUTOF10: 2
PAINLEVEL_OUTOF10: 0
PAINLEVEL_OUTOF10: 4
PAINLEVEL_OUTOF10: 0
PAINLEVEL_OUTOF10: 5
PAINLEVEL_OUTOF10: 4
PAINLEVEL_OUTOF10: 0
PAINLEVEL_OUTOF10: 4
PAINLEVEL_OUTOF10: 0

## 2018-11-02 ASSESSMENT — PAIN DESCRIPTION - LOCATION
LOCATION: CHEST
LOCATION: CHEST
LOCATION: BACK
LOCATION: CHEST
LOCATION: CHEST

## 2018-11-02 ASSESSMENT — PAIN DESCRIPTION - FREQUENCY
FREQUENCY: INTERMITTENT

## 2018-11-02 ASSESSMENT — PAIN DESCRIPTION - ORIENTATION: ORIENTATION: MID

## 2018-11-02 ASSESSMENT — ENCOUNTER SYMPTOMS
SHORTNESS OF BREATH: 1
ABDOMINAL PAIN: 0
DIARRHEA: 0
SORE THROAT: 0
RHINORRHEA: 0
CONSTIPATION: 0
COLOR CHANGE: 0
COUGH: 0
WHEEZING: 0
NAUSEA: 0
SINUS PRESSURE: 0
VOMITING: 0

## 2018-11-02 ASSESSMENT — PAIN DESCRIPTION - DESCRIPTORS
DESCRIPTORS: DULL
DESCRIPTORS: DISCOMFORT;ACHING

## 2018-11-02 ASSESSMENT — PAIN DESCRIPTION - PAIN TYPE: TYPE: CHRONIC PAIN

## 2018-11-02 ASSESSMENT — PAIN DESCRIPTION - DIRECTION: RADIATING_TOWARDS: NONRADIATING

## 2018-11-02 ASSESSMENT — PAIN DESCRIPTION - ONSET: ONSET: ON-GOING

## 2018-11-02 NOTE — ED NOTES
Pt presents to the er per ems for c/o shortness of breath and chest pain pt is with respirations even and unlabored pt has wheezing in the right middle and lower lobe pt is short of breath with activity pt states that this started today pts bilateral lower legs are with 3+ non pitting edema     Gardenia Miller RN  11/01/18 1330

## 2018-11-02 NOTE — ED PROVIDER NOTES
[address] [facility] [address]  eMERGENCY dEPARTMENT eNCOUnter      Pt Name: Nicholas Lopez  MRN: [id number]  [place] 1940  Date of evaluation: 11/1/2018  Provider: Fanny Dominique NP, ZAINAB Castillo 6639       Chief Complaint   Patient presents with    Shortness of Breath    Chest Pain         HISTORY OF PRESENT ILLNESS  (Location/Symptom, Timing/Onset, Context/Setting, Quality, Duration, Modifying Factors, Severity.)   Nicholas Lopez is a [de-identified] y.o. male who presents to the emergency department The day by private vehicle for evaluation of chest pain and shortness of breath. She states that since earlier today he's experiencing a midsternal chest pain or shortness of breath. EMS came and evaluated him but he decided to stay home. He states that later he had the same episode of chest pain and shortness of breath so he came to the ER. He does have history of COPD, CHF and coronary artery disease. He had a triple bypass years ago. His shortness of breath appears to be with exertion. he second round of chemotherapy for non-Hodgkin's lymphoma. Nursing Notes were reviewed. ALLERGIES     Patient has no known allergies. CURRENT MEDICATIONS       Current Discharge Medication List      CONTINUE these medications which have NOT CHANGED    Details   famotidine (PEPCID) 20 MG tablet Take 20 mg by mouth 2 times daily      omeprazole (PRILOSEC) 20 MG delayed release capsule Take 20 mg by mouth Daily      acyclovir (ZOVIRAX) 400 MG tablet Take 400 mg by mouth 2 times daily      ibuprofen (ADVIL;MOTRIN) 600 MG tablet Take 600 mg by mouth 3 times daily Take one tablet daily for five days starting the day of neulasta      senna-docusate (PERICOLACE) 8.6-50 MG per tablet Take 2 tablets by mouth 2 times daily as needed for Constipation Take one or two tablets as needed for constipation      oxyCODONE 5 MG capsule Take 5 mg by mouth every 4 hours as needed for Pain.  Take two to three tablets every four hours as needed for pain 1.68 (*)     Potassium 3.4 (*)     Chloride 93 (*)     Anion Gap 22 (*)     GFR Non- 40 (*)     GFR  48 (*)     All other components within normal limits   BRAIN NATRIURETIC PEPTIDE - Abnormal; Notable for the following:     Pro-BNP 2,332 (*)     All other components within normal limits   TROP/MYOGLOBIN   D-DIMER, QUANTITATIVE   COMPREHENSIVE METABOLIC PANEL W/ REFLEX TO MG FOR LOW K   MAGNESIUM   TROPONIN   TROPONIN   LIPID PANEL   CBC       All other labs were within normal range or not returned as of this dictation.     EMERGENCY DEPARTMENT COURSE and DIFFERENTIAL DIAGNOSIS/MDM:   Vitals:    Vitals:    11/01/18 2315 11/01/18 2340 11/01/18 2345 11/02/18 0100   BP: (!) 120/96  135/86 (!) 135/103   Pulse:   92 110   Resp:  20 17 22   Temp:    97.7 °F (36.5 °C)   TempSrc:    Temporal   SpO2:  93% 92% 91%   Weight:    154 lb 15.7 oz (70.3 kg)   Height:    5' 2.01\" (1.575 m)       Medical Decision Making:   Medications   acyclovir (ZOVIRAX) tablet 400 mg (not administered)   allopurinol (ZYLOPRIM) tablet 300 mg (not administered)   mometasone-formoterol (DULERA) 200-5 MCG/ACT inhaler 2 puff (not administered)   dapsone tablet 100 mg (not administered)   famotidine (PEPCID) tablet 20 mg (not administered)   gabapentin (NEURONTIN) capsule 300 mg (not administered)   pantoprazole (PROTONIX) tablet 40 mg (not administered)   predniSONE (DELTASONE) tablet 50 mg (not administered)   tiotropium (SPIRIVA) inhalation capsule 18 mcg (not administered)   sodium chloride flush 0.9 % injection 10 mL (not administered)   sodium chloride flush 0.9 % injection 10 mL (not administered)   potassium chloride (KLOR-CON M) extended release tablet 40 mEq (not administered)     Or   potassium chloride 20 MEQ/15ML (10%) oral solution 40 mEq (not administered)     Or   potassium chloride 10 mEq/100 mL IVPB (Peripheral Line) (not administered)   potassium chloride 10 mEq/100 mL IVPB (Peripheral Line) (not

## 2018-11-02 NOTE — FLOWSHEET NOTE
Dr. Florinda Garcia arrived to the patient's bedside and was updated on the patient's current status via RN. Plan for stress test tomorrow. Awaiting echocardiogram to be performed today. New orders received for soft diet, and consult to hemoc/ oncology & cardiology. Dr. Florinda Garcia to call patient's spouse to determine who his cardiologist and oncologist is,as patient has short -term memory loss and is unable to provide that information. Will continue to monitor closely. Dr. Florinda Garcia confirmed with patient's spouse via telephone that the patient's oncologist is Dr. Alok Gauthier and he has been seen in the past by Dr. Devonte Gonzalez for cardiology needs. RN to call consults. Dr. Florinda Garcia remains in department and informed via RN that the patient reports chest pain and \"i am feeling anxious. \" Nitroglycerin given and patient reports chest pain has subsided but he is still feeling anxious. New order received for xanax (see order for details).

## 2018-11-02 NOTE — H&P
Prior to Admission medications    Medication Sig Start Date End Date Taking? Authorizing Provider   famotidine (PEPCID) 20 MG tablet Take 20 mg by mouth 2 times daily   Yes Historical Provider, MD   omeprazole (PRILOSEC) 20 MG delayed release capsule Take 20 mg by mouth Daily   Yes Historical Provider, MD   acyclovir (ZOVIRAX) 400 MG tablet Take 400 mg by mouth 2 times daily   Yes Historical Provider, MD   ibuprofen (ADVIL;MOTRIN) 600 MG tablet Take 600 mg by mouth 3 times daily Take one tablet daily for five days starting the day of neulasta   Yes Historical Provider, MD   senna-docusate (PERICOLACE) 8.6-50 MG per tablet Take 2 tablets by mouth 2 times daily as needed for Constipation Take one or two tablets as needed for constipation   Yes Historical Provider, MD   oxyCODONE 5 MG capsule Take 5 mg by mouth every 4 hours as needed for Pain. Take two to three tablets every four hours as needed for pain . Yes Historical Provider, MD   dapsone 100 MG tablet Take 100 mg by mouth daily Take one tablet daily while on chemotherapy until instructed to stop   Yes Historical Provider, MD   prochlorperazine (COMPAZINE) 10 MG tablet Take 10 mg by mouth every 6 hours as needed   Yes Historical Provider, MD   allopurinol (ZYLOPRIM) 300 MG tablet Take 300 mg by mouth daily Take one tablet daily for 14 days starting 10/25/2018 in the morning   Yes Historical Provider, MD   gabapentin (NEURONTIN) 300 MG capsule Take 300 mg by mouth 2 times daily. May increase to two tablets twice daily after one week starting 10/31/2018 .    Yes Historical Provider, MD   predniSONE (DELTASONE) 50 MG tablet Take 50 mg by mouth 2 times daily Take one tablet twice daily with food for five days starting the day after chemo   Yes Historical Provider, MD   Loratadine-Pseudoephedrine (PX ALLERGY RELIEF D, LORATID, PO) Take 10 mg by mouth daily as needed   Yes Historical Provider, MD   docusate sodium (COLACE) 100 MG capsule Take 1 capsule by mouth 2 times daily 9/11/18  Yes ZAINAB Craig - CNP   tiotropium (SPIRIVA) 18 MCG inhalation capsule Inhale 18 mcg into the lungs daily   Yes Historical Provider, MD   zolpidem (AMBIEN) 10 MG tablet  10/17/15  Yes Historical Provider, MD   budesonide-formoterol (SYMBICORT) 160-4.5 MCG/ACT AERO Inhale 2 puffs into the lungs 2 times daily   Yes Historical Provider, MD   furosemide (LASIX) 40 MG tablet TAKE ONE TABLET BY MOUTH ONCE DAILY 9/19/18   Alyssa Castañeda MD        Allergies:     Patient has no known allergies. Social History:     Tobacco:    reports that he quit smoking about 7 years ago. His smoking use included Cigarettes. He has a 55.00 pack-year smoking history. He has never used smokeless tobacco.  Alcohol:      reports that he does not drink alcohol. Drug Use:  reports that he does not use drugs. Family History:     History reviewed. No pertinent family history. Review of Systems:     Positive and Negative as described in HPI. CONSTITUTIONAL:  negative for fevers, chills, sweats, fatigue, weight loss  HEENT:  negative for vision, hearing changes, runny nose, throat pain  RESPIRATORY:+shortness of breath, no cough, congestion, wheezing. CARDIOVASCULAR:  As described in HPI. GASTROINTESTINAL:  negative for nausea, vomiting, diarrhea, constipation, change in bowel habits, abdominal pain   GENITOURINARY:  negative for difficulty of urination, burning with urination, frequency   INTEGUMENT:  negative for rash, skin lesions, easy bruising   HEMATOLOGIC/LYMPHATIC:  negative for swelling/edema   ALLERGIC/IMMUNOLOGIC:  negative for urticaria , itching  ENDOCRINE:  negative increase in drinking, increase in urination, hot or cold intolerance  MUSCULOSKELETAL:  negative joint pains, muscle aches, swelling of joints  Chronic left LE edema which he feels is slightly worse.    NEUROLOGICAL:  negative for headaches, dizziness, lightheadedness, numbness, pain, tingling extremities  BEHAVIOR/PSYCH: degrees   CBC Auto Differential    Collection Time: 11/01/18  9:32 PM   Result Value Ref Range    WBC 50.0 (HH) 3.5 - 11.0 k/uL    RBC 3.06 (L) 4.5 - 5.9 m/uL    Hemoglobin 10.5 (L) 13.5 - 17.5 g/dL    Hematocrit 30.7 (L) 41 - 53 %    .2 (H) 80 - 100 fL    MCH 34.2 (H) 26 - 34 pg    MCHC 34.1 31 - 37 g/dL    RDW 15.0 (H) 11.5 - 14.5 %    Platelets 819 (H) 642 - 400 k/uL    MPV NOT REPORTED 6.0 - 12.0 fL    NRBC Automated NOT REPORTED per 100 WBC    Differential Type NOT REPORTED     Immature Granulocytes NOT REPORTED 0 %    Absolute Immature Granulocyte NOT REPORTED 0.00 - 0.30 k/uL    WBC Morphology NOT REPORTED     RBC Morphology NOT REPORTED     Platelet Estimate NOT REPORTED     Seg Neutrophils 95 (H) 36 - 66 %    Lymphocytes 0 (L) 24 - 44 %    Monocytes 5 1 - 7 %    Eosinophils % 0 (L) 1 - 4 %    Basophils 0 %    Segs Absolute 47.50 (H) 1.8 - 7.7 k/uL    Absolute Lymph # 0.00 (L) 1.0 - 4.8 k/uL    Absolute Mono # 2.50 (H) 0.2 - 0.8 k/uL    Absolute Eos # 0.00 0.0 - 0.4 k/uL    Basophils # 0.00 0.0 - 0.2 k/uL   Basic Metabolic Panel    Collection Time: 11/01/18  9:32 PM   Result Value Ref Range    Glucose 215 (H) 70 - 99 mg/dL    BUN 30 (H) 8 - 23 mg/dL    CREATININE 1.68 (H) 0.70 - 1.20 mg/dL    Bun/Cre Ratio 18 9 - 20    Calcium 8.9 8.6 - 10.4 mg/dL    Sodium 135 135 - 144 mmol/L    Potassium 3.4 (L) 3.7 - 5.3 mmol/L    Chloride 93 (L) 98 - 107 mmol/L    CO2 20 20 - 31 mmol/L    Anion Gap 22 (H) 9 - 17 mmol/L    GFR Non-African American 40 (L) >60 mL/min    GFR  48 (L) >60 mL/min    GFR Comment          GFR Staging NOT REPORTED    Trop/Myoglobin    Collection Time: 11/01/18  9:32 PM   Result Value Ref Range    Troponin T <0.03 <0.03 ng/mL    Troponin Interp          Myoglobin 66 28 - 72 ng/mL   Brain Natriuretic Peptide    Collection Time: 11/01/18  9:32 PM   Result Value Ref Range    Pro-BNP 2,332 (H) <300 pg/mL    BNP Interpretation         D-Dimer, Quantitative    Collection Time: 33.7 31 - 37 g/dL    RDW 16.7 (H) 11.5 - 14.5 %    Platelets 699 497 - 240 k/uL    MPV 9.1 6.0 - 12.0 fL    NRBC Automated NOT REPORTED per 100 WBC   Troponin    Collection Time: 11/02/18  4:46 AM   Result Value Ref Range    Troponin T <0.03 <0.03 ng/mL    Troponin Interp         *    Assessment :      Primary Problem  Chest pain    Active Hospital Problems    Diagnosis Date Noted    Leukemoid reaction [D72.823] 11/02/2018    Generalized anxiety disorder [F41.1] 11/02/2018    DAVID (acute kidney injury) ([facility] [N17.9] 11/02/2018    Prostate cancer ([facility] [address] [C61]     Non Hodgkin's lymphoma ([facility] [address] [C85.90]     COPD (chronic obstructive pulmonary disease) (HCC) [J44.9]     Acute systolic congestive heart failure (HCC) [I50.21]     CAD (coronary artery disease) [I25.10]     Shortness of breath [R06.02]     Chest pain [R07.9] 11/01/2018    Essential hypertension [I10] 11/03/2016       Plan:     Patient status Admit as inpatient in the  Progressive Unit/Step down  - Leukemoid reaction to growth factors with Ac. 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 Stress in AM. Cardiology and oncology consultation. [id number] Sharri Estrada for soft diet. Left >right LE edema: check venous dopplers.   - DAVID on CKD2: likely renal sec to will monitor , check urine sodium and creatinine. ?neupogen related. If doesnot improve/worsens will consult nephrology/   - Anxiety disorder: Add xanax BID continue Ativan PRN as ordered. - COPD: not with exacerbation clinically. - CAD s/p CABG with  Noncomplaince. - restart acyclovir and dapsone for prophylaxis. - restart om medications: pls see orders for reconciliation information.   - DVT/GI prophylaxis.      Consultations:   IP CONSULT TO INTERNAL MEDICINE  IP CONSULT TO DIETITIAN  IP CONSULT TO SOCIAL WORK  IP CONSULT TO ONCOLOGY  IP CONSULT TO CARDIOLOGY   Patient is admitted as inpatient status because of co-morbidities listed above,

## 2018-11-02 NOTE — FLOWSHEET NOTE
Nuc med called and informed RN that there is currently a shortage of isotope and only inpatient's will be able to receive stress tests at this time. Nuc med informed RN that due to patient undergoing VQ scan yesterday, he is not able to have a stress test today; however, nuc med asked RN to clarify whether or not the patient will need to undergo a stress test tomorrow. RN to notify Dr. Luis Mckeon. 0: RN notified Dr. Luis Mckeon via perfect serve. Awaiting response at this time.

## 2018-11-02 NOTE — FLOWSHEET NOTE
Dr. Ochoa Monroy in department and notified of new consult. Dr. Ochoa Monroy stated he is rounding for Dr. Farhat Melchor today and will evaluate the patient.

## 2018-11-02 NOTE — CONSULTS
[facility]  In Patient Cardiology Consult             Cardiology   Consult Note          History Obtained From:  patient    HISTORY OF PRESENT ILLNESS:      The patient is a [de-identified] y.o. male seen for SOB. He presents with chest pain and shortness of breath. He does have history of COPD, CHF and coronary artery disease s/p prior revascularixation. He seems to be confused but appears to be pain free this AM.  He has LE edema (L>R) but no PND. Past Medical History:    Past Medical History:   Diagnosis Date    CAD (coronary artery disease)     Cancer ([facility] [address]     CHF (congestive heart failure) (HCC)     COPD (chronic obstructive pulmonary disease) (HCC)     Hyperlipidemia     Hypertension     [facility]     [facility]     [facility] cancer ([facility]     Sleep apnea      Past Surgical History:    Past Surgical History:   Procedure Laterality Date    CORONARY ARTERY BYPASS GRAFT      6 yrs ago     Home Medications:  Prior to Admission medications    Medication Sig Start Date End Date Taking? Authorizing Provider   famotidine (PEPCID) 20 MG tablet Take 20 mg by mouth 2 times daily   Yes Historical Provider, MD   omeprazole (PRILOSEC) 20 MG delayed release capsule Take 20 mg by mouth Daily   Yes Historical Provider, MD   acyclovir (ZOVIRAX) 400 MG tablet Take 400 mg by mouth 2 times daily   Yes Historical Provider, MD   ibuprofen (ADVIL;MOTRIN) 600 MG tablet Take 600 mg by mouth 3 times daily Take one tablet daily for five days starting the day of neulasta   Yes Historical Provider, MD   senna-docusate (PERICOLACE) 8.6-50 MG per tablet Take 2 tablets by mouth 2 times daily as needed for Constipation Take one or two tablets as needed for constipation   Yes Historical Provider, MD   oxyCODONE 5 MG capsule Take 5 mg by mouth every 4 hours as needed for Pain. Take two to three tablets every four hours as needed for pain .    Yes Historical Provider, MD   dapsone 100 MG tablet

## 2018-11-02 NOTE — FLOWSHEET NOTE
Patient's spouse at the bedside and updated on the patient's current status via RN. RN informed patient and patient's spouse that the patient will be transported to room 1011 bed #2. Patient and patient's spouse verbalize understanding.

## 2018-11-02 NOTE — PROGRESS NOTES
[facility]es Mohan, [facility] Chest pain [R07.9] . Vitals:    11/02/18 0100   BP: (!) 135/103   Pulse: 110   Resp: 22   Temp: 97.7 °F (36.5 °C)   SpO2: 91%   . Patients home meds are   Prior to Admission medications    Medication Sig Start Date End Date Taking? Authorizing Provider   famotidine (PEPCID) 20 MG tablet Take 20 mg by mouth 2 times daily   Yes Historical Provider, MD   omeprazole (PRILOSEC) 20 MG delayed release capsule Take 20 mg by mouth Daily   Yes Historical Provider, MD   acyclovir (ZOVIRAX) 400 MG tablet Take 400 mg by mouth 2 times daily   Yes Historical Provider, MD   ibuprofen (ADVIL;MOTRIN) 600 MG tablet Take 600 mg by mouth 3 times daily Take one tablet daily for five days starting the day of neulasta   Yes Historical Provider, MD   senna-docusate (PERICOLACE) 8.6-50 MG per tablet Take 2 tablets by mouth 2 times daily as needed for Constipation Take one or two tablets as needed for constipation   Yes Historical Provider, MD   oxyCODONE 5 MG capsule Take 5 mg by mouth every 4 hours as needed for Pain. Take two to three tablets every four hours as needed for pain . Yes Historical Provider, MD   dapsone 100 MG tablet Take 100 mg by mouth daily Take one tablet daily while on chemotherapy until instructed to stop   Yes Historical Provider, MD   prochlorperazine (COMPAZINE) 10 MG tablet Take 10 mg by mouth every 6 hours as needed   Yes Historical Provider, MD   allopurinol (ZYLOPRIM) 300 MG tablet Take 300 mg by mouth daily Take one tablet daily for 14 days starting 10/25/2018 in the morning   Yes Historical Provider, MD   gabapentin (NEURONTIN) 300 MG capsule Take 300 mg by mouth 2 times daily. May increase to two tablets twice daily after one week starting 10/31/2018 .    Yes Historical Provider, MD   predniSONE (DELTASONE) 50 MG tablet Take 50 mg by mouth 2 times daily Take one tablet twice daily with food for five days starting the day after chemo   Yes Historical Provider, MD []  Above 50-59%  []  Unable []  Above 35-49%  []  Unable []  Less than 35%  []  Unable                 []  Hyperinflation Assessment  Score 1 2 3   CXR and Breath Sounds   []  Clear []  No atelectasis  Basilar aeration []  Atelectasis or absent basilar breath sounds   Incentive Spirometry Volume  (Per IBW)   []  Greater than or equal to 15ml/Kg []  less than 15ml/Kg []  less than 15ml/Kg   Surgery within last 2 weeks []  None or general   []  Abdominal or thoracic surgery  []  Abdominal or thoracic   Chronic Pulmonary Historyre []  No []  Yes []  Yes     []  Secretion Management Assessment  Score 1 2 3   Bilateral Breath Sounds   []  Occasional Rhonchi []  Scattered Rhonchi []  Course Rhonchi and/or poor aeration   Sputum    []  Small amount of thin secretions []  Moderate amount of viscous secretions []  Copius, Viscious Yellow/ Secretions   CXR as reported by physician []  clear  []  Unavailable []  Infiltrates and/or consolidation  []  Unavailable []  Mucus Plugging and or lobar consolidation  []  Unavailable   Cough []  Strong, productive cough []  Weak productive cough []  No cough or weak non-productive cough   Francesca Richard  1:21 AM                            FEMALE                                  MALE                            FEV1 Predicted Normal Values                        FEV1 Predicted Normal Values          Age                                     Height in Feet and Inches       Age                                     Height in Feet and Inches       4' 11\" 5' 1\" 5' 3\" 5' 5\" 5' 7\" 5' 9\" 5' 11\" 6' 1\"  4' 11\" 5' 1\" 5' 3\" 5' 5\" 5' 7\" 5' 9\" 5' 11\" 6' 1\"   42 - 45 2.49 2.66 2.84 3.03 3.22 3.42 3.62 3.83 42 - 45 2.82 3.03 3.26 3.49 3.72 3.96 4.22 4.47   46 - 49 2.40 2.57 2.76 2.94 3.14 3.33 3.54 3.75 46 - 49 2.70 2.92 3.14 3.37 3.61 3.85 4.10 4.36   50 - 53 2.31 2.48 2.66 2.85 3.04 3.24 3.45 3.66 50 - 53 2.58 2.80 3.02 3.25 3.49 3.73 3.98 4.24   54 - 57 2.21 2.38 2.57 2.75 2.95 3.14 3.35 3.56 54 - 57 2.46 2. 67 2.89 3.12 3.36 3.60 3.85 4.11   58 - 61 2.10 2.28 2.46 2.65 2.84 3.04 3.24 3.45 58 - 61 2.32 2.54 2.76 2.99 3.23 3.47 3.72 3.98   62 - 65 1.99 2.17 2.35 2.54 2.73 2.93 3.13 3.34 62 - 65 2.19 2.40 2.62 2.85 3.09 3.33 3.58 3.84   66 - 69 1.88 2.05 2.23 2.42 2.61 2.81 3.02 3.23 66 - 69 2.04 2.26 2.48 2.71 2.95 3.19 3.44 3.70   70+ 1.82 1.99 2.17 2.36 2.55 2.75 2.95 3.16 70+ 1.97 2.19 2.41 2.64 2.87 3.12 3.37 3.62             Predicted Peak Expiratory Flow Rate                                       Height (in)  Female       Height (in) Male           Age 64 63 56 61 58 73 78 74 Age            21 344 357 372 387 402 417 432 446  60 62 64 66 68 70 72 74 76   25 337 352 366 381 396 411 426 441 25 447 476 [phone] [phone]   30 329 344 359 374 389 404 419 434 30 437 466 494 523 [phone] 667   35 322 337 351 366 381 396 411 426 35 426 455 484 512 541 [phone]   40 314 329 344 359 374 389 404 419 40 416 445 473 [phone] 617 647   45 307 322 336 351 366 381 396 411 45 405 434 463 491 520 549 577 606 636   50 299 314 329 344 359 374 389 404 50 395 424 452 481 510 538 567 596 625   55 292 307 321 336 351 366 381 396 55 384 413 442 470 499 528 556 585 615   60 284 299 314 329 344 359 374 389 60 374 403 431 460 489 517 546 575 605   65 277 292 306 321 336 351 366 381 65 363 392 421 449 478 507 535 564 594   70 269 284 299 314 329 344 359 374 70 353 382 410 439 468 496 525 554 583   75 261 274 289 305 319 334 348 364 75 344 372 400 429 458 487 515 544 573   80 253 266 282 296 312 327 342 356 80 335 362 390 419 448 476 [phone]

## 2018-11-03 ENCOUNTER — APPOINTMENT (OUTPATIENT)
Dept: NUCLEAR MEDICINE | Age: 78
DRG: 303 | End: 2018-11-03
Payer: MEDICARE

## 2018-11-03 PROBLEM — D75.839 THROMBOCYTOSIS: Status: ACTIVE | Noted: 2018-11-03

## 2018-11-03 PROBLEM — R94.39 ABNORMAL STRESS TEST: Status: ACTIVE | Noted: 2018-11-03

## 2018-11-03 LAB
ABSOLUTE EOS #: 0 K/UL (ref 0–0.4)
ABSOLUTE IMMATURE GRANULOCYTE: ABNORMAL K/UL (ref 0–0.3)
ABSOLUTE LYMPH #: 1.76 K/UL (ref 1–4.8)
ABSOLUTE MONO #: 0.88 K/UL (ref 0.2–0.8)
ALBUMIN SERPL-MCNC: 3.7 G/DL (ref 3.5–5.2)
ALBUMIN/GLOBULIN RATIO: ABNORMAL (ref 1–2.5)
ALP BLD-CCNC: 96 U/L (ref 40–129)
ALT SERPL-CCNC: 10 U/L (ref 5–41)
ANION GAP SERPL CALCULATED.3IONS-SCNC: 17 MMOL/L (ref 9–17)
AST SERPL-CCNC: 17 U/L
BASOPHILS # BLD: 0 %
BASOPHILS ABSOLUTE: 0 K/UL (ref 0–0.2)
BILIRUB SERPL-MCNC: 0.7 MG/DL (ref 0.3–1.2)
BNP INTERPRETATION: ABNORMAL
BUN BLDV-MCNC: 29 MG/DL (ref 8–23)
BUN/CREAT BLD: 21 (ref 9–20)
CALCIUM SERPL-MCNC: 8.9 MG/DL (ref 8.6–10.4)
CHLORIDE BLD-SCNC: 100 MMOL/L (ref 98–107)
CO2: 24 MMOL/L (ref 20–31)
CREAT SERPL-MCNC: 1.37 MG/DL (ref 0.7–1.2)
CREATININE URINE: 41.6 MG/DL (ref 39–259)
DIFFERENTIAL TYPE: ABNORMAL
EOSINOPHILS RELATIVE PERCENT: 0 % (ref 1–4)
GFR AFRICAN AMERICAN: >60 ML/MIN
GFR NON-AFRICAN AMERICAN: 50 ML/MIN
GFR SERPL CREATININE-BSD FRML MDRD: ABNORMAL ML/MIN/{1.73_M2}
GFR SERPL CREATININE-BSD FRML MDRD: ABNORMAL ML/MIN/{1.73_M2}
GLUCOSE BLD-MCNC: 149 MG/DL (ref 70–99)
HCT VFR BLD CALC: 29.1 % (ref 41–53)
HEMOGLOBIN: 9.9 G/DL (ref 13.5–17.5)
IMMATURE GRANULOCYTES: ABNORMAL %
LV EF: 52 %
LVEF MODALITY: NORMAL
LYMPHOCYTES # BLD: 2 % (ref 24–44)
MCH RBC QN AUTO: 34.6 PG (ref 26–34)
MCHC RBC AUTO-ENTMCNC: 34.2 G/DL (ref 31–37)
MCV RBC AUTO: 101.2 FL (ref 80–100)
MONOCYTES # BLD: 1 % (ref 1–7)
MORPHOLOGY: ABNORMAL
NRBC AUTOMATED: ABNORMAL PER 100 WBC
PDW BLD-RTO: 16.9 % (ref 11.5–14.5)
PLATELET # BLD: 446 K/UL (ref 130–400)
PLATELET ESTIMATE: ABNORMAL
PMV BLD AUTO: 8.8 FL (ref 6–12)
POTASSIUM SERPL-SCNC: 4.4 MMOL/L (ref 3.7–5.3)
PRO-BNP: 6364 PG/ML
RBC # BLD: 2.88 M/UL (ref 4.5–5.9)
RBC # BLD: ABNORMAL 10*6/UL
SEG NEUTROPHILS: 97 % (ref 36–66)
SEGMENTED NEUTROPHILS ABSOLUTE COUNT: 85.46 K/UL (ref 1.8–7.7)
SODIUM BLD-SCNC: 141 MMOL/L (ref 135–144)
SODIUM,UR: <20 MMOL/L
TOTAL PROTEIN: 5.8 G/DL (ref 6.4–8.3)
WBC # BLD: 88.1 K/UL (ref 3.5–11)
WBC # BLD: ABNORMAL 10*3/UL

## 2018-11-03 PROCEDURE — 6370000000 HC RX 637 (ALT 250 FOR IP): Performed by: INTERNAL MEDICINE

## 2018-11-03 PROCEDURE — 82570 ASSAY OF URINE CREATININE: CPT

## 2018-11-03 PROCEDURE — 85025 COMPLETE CBC W/AUTO DIFF WBC: CPT

## 2018-11-03 PROCEDURE — 2060000000 HC ICU INTERMEDIATE R&B

## 2018-11-03 PROCEDURE — 99232 SBSQ HOSP IP/OBS MODERATE 35: CPT | Performed by: INTERNAL MEDICINE

## 2018-11-03 PROCEDURE — 80053 COMPREHEN METABOLIC PANEL: CPT

## 2018-11-03 PROCEDURE — 6370000000 HC RX 637 (ALT 250 FOR IP): Performed by: NURSE PRACTITIONER

## 2018-11-03 PROCEDURE — 94640 AIRWAY INHALATION TREATMENT: CPT

## 2018-11-03 PROCEDURE — 83880 ASSAY OF NATRIURETIC PEPTIDE: CPT

## 2018-11-03 PROCEDURE — 36415 COLL VENOUS BLD VENIPUNCTURE: CPT

## 2018-11-03 PROCEDURE — 3430000000 HC RX DIAGNOSTIC RADIOPHARMACEUTICAL: Performed by: NURSE PRACTITIONER

## 2018-11-03 PROCEDURE — 93005 ELECTROCARDIOGRAM TRACING: CPT

## 2018-11-03 PROCEDURE — 93017 CV STRESS TEST TRACING ONLY: CPT

## 2018-11-03 PROCEDURE — 2700000000 HC OXYGEN THERAPY PER DAY

## 2018-11-03 PROCEDURE — 84300 ASSAY OF URINE SODIUM: CPT

## 2018-11-03 PROCEDURE — A9500 TC99M SESTAMIBI: HCPCS | Performed by: NURSE PRACTITIONER

## 2018-11-03 PROCEDURE — 99233 SBSQ HOSP IP/OBS HIGH 50: CPT | Performed by: INTERNAL MEDICINE

## 2018-11-03 PROCEDURE — 6360000002 HC RX W HCPCS: Performed by: NURSE PRACTITIONER

## 2018-11-03 PROCEDURE — 78452 HT MUSCLE IMAGE SPECT MULT: CPT

## 2018-11-03 PROCEDURE — 2580000003 HC RX 258: Performed by: NURSE PRACTITIONER

## 2018-11-03 RX ORDER — SODIUM CHLORIDE 0.9 % (FLUSH) 0.9 %
10 SYRINGE (ML) INJECTION PRN
Status: ACTIVE | OUTPATIENT
Start: 2018-11-03 | End: 2018-11-04

## 2018-11-03 RX ORDER — AMINOPHYLLINE DIHYDRATE 25 MG/ML
100 INJECTION, SOLUTION INTRAVENOUS
Status: COMPLETED | OUTPATIENT
Start: 2018-11-03 | End: 2018-11-03

## 2018-11-03 RX ORDER — ISOSORBIDE MONONITRATE 30 MG/1
30 TABLET, EXTENDED RELEASE ORAL DAILY
Status: DISCONTINUED | OUTPATIENT
Start: 2018-11-03 | End: 2018-11-04 | Stop reason: HOSPADM

## 2018-11-03 RX ORDER — 0.9 % SODIUM CHLORIDE 0.9 %
250 INTRAVENOUS SOLUTION INTRAVENOUS ONCE
Status: DISCONTINUED | OUTPATIENT
Start: 2018-11-03 | End: 2018-11-04 | Stop reason: HOSPADM

## 2018-11-03 RX ADMIN — IBUPROFEN 600 MG: 200 TABLET, FILM COATED ORAL at 10:18

## 2018-11-03 RX ADMIN — LORATADINE 10 MG: 10 TABLET ORAL at 10:18

## 2018-11-03 RX ADMIN — PREDNISONE 50 MG: 20 TABLET ORAL at 20:52

## 2018-11-03 RX ADMIN — TETRAKIS(2-METHOXYISOBUTYLISOCYANIDE)COPPER(I) TETRAFLUOROBORATE 10.3 MILLICURIE: 1 INJECTION, POWDER, LYOPHILIZED, FOR SOLUTION INTRAVENOUS at 07:00

## 2018-11-03 RX ADMIN — GABAPENTIN 300 MG: 300 CAPSULE ORAL at 10:19

## 2018-11-03 RX ADMIN — ACYCLOVIR 400 MG: 200 CAPSULE ORAL at 10:18

## 2018-11-03 RX ADMIN — Medication 10 ML: at 10:31

## 2018-11-03 RX ADMIN — ACYCLOVIR 400 MG: 200 CAPSULE ORAL at 20:52

## 2018-11-03 RX ADMIN — DAPSONE 100 MG: 100 TABLET ORAL at 10:30

## 2018-11-03 RX ADMIN — FAMOTIDINE 20 MG: 20 TABLET ORAL at 10:19

## 2018-11-03 RX ADMIN — ISOSORBIDE MONONITRATE 30 MG: 30 TABLET ORAL at 12:44

## 2018-11-03 RX ADMIN — AMINOPHYLLINE 50 MG: 25 INJECTION, SOLUTION INTRAVENOUS at 09:22

## 2018-11-03 RX ADMIN — HEPARIN SODIUM 5000 UNITS: 5000 INJECTION INTRAVENOUS; SUBCUTANEOUS at 14:56

## 2018-11-03 RX ADMIN — ALLOPURINOL 300 MG: 300 TABLET ORAL at 10:19

## 2018-11-03 RX ADMIN — FAMOTIDINE 20 MG: 20 TABLET ORAL at 20:52

## 2018-11-03 RX ADMIN — Medication 0.4 MG: at 11:50

## 2018-11-03 RX ADMIN — HEPARIN SODIUM 5000 UNITS: 5000 INJECTION INTRAVENOUS; SUBCUTANEOUS at 20:58

## 2018-11-03 RX ADMIN — REGADENOSON 0.4 MG: 0.08 INJECTION, SOLUTION INTRAVENOUS at 09:05

## 2018-11-03 RX ADMIN — ATORVASTATIN CALCIUM 40 MG: 40 TABLET, FILM COATED ORAL at 20:51

## 2018-11-03 RX ADMIN — ALPRAZOLAM 0.5 MG: 0.5 TABLET ORAL at 07:22

## 2018-11-03 RX ADMIN — DOCUSATE SODIUM 100 MG: 100 CAPSULE, LIQUID FILLED ORAL at 10:19

## 2018-11-03 RX ADMIN — MOMETASONE FUROATE AND FORMOTEROL FUMARATE DIHYDRATE 2 PUFF: 200; 5 AEROSOL RESPIRATORY (INHALATION) at 19:44

## 2018-11-03 RX ADMIN — IBUPROFEN 600 MG: 200 TABLET, FILM COATED ORAL at 14:56

## 2018-11-03 RX ADMIN — DOCUSATE SODIUM 100 MG: 100 CAPSULE, LIQUID FILLED ORAL at 20:51

## 2018-11-03 RX ADMIN — IBUPROFEN 600 MG: 200 TABLET, FILM COATED ORAL at 20:51

## 2018-11-03 RX ADMIN — GABAPENTIN 300 MG: 300 CAPSULE ORAL at 20:52

## 2018-11-03 RX ADMIN — Medication 0.4 MG: at 09:26

## 2018-11-03 RX ADMIN — Medication 10 ML: at 20:51

## 2018-11-03 RX ADMIN — PREDNISONE 50 MG: 20 TABLET ORAL at 10:19

## 2018-11-03 RX ADMIN — BISACODYL 10 MG: 10 SUPPOSITORY RECTAL at 10:18

## 2018-11-03 RX ADMIN — TETRAKIS(2-METHOXYISOBUTYLISOCYANIDE)COPPER(I) TETRAFLUOROBORATE 32.2 MILLICURIE: 1 INJECTION, POWDER, LYOPHILIZED, FOR SOLUTION INTRAVENOUS at 09:05

## 2018-11-03 RX ADMIN — HEPARIN SODIUM 5000 UNITS: 5000 INJECTION INTRAVENOUS; SUBCUTANEOUS at 06:29

## 2018-11-03 RX ADMIN — ALPRAZOLAM 0.5 MG: 0.5 TABLET ORAL at 20:52

## 2018-11-03 RX ADMIN — ASPIRIN 325 MG: 325 TABLET, DELAYED RELEASE ORAL at 10:18

## 2018-11-03 ASSESSMENT — PAIN DESCRIPTION - ORIENTATION: ORIENTATION: LOWER

## 2018-11-03 ASSESSMENT — PAIN SCALES - GENERAL
PAINLEVEL_OUTOF10: 8
PAINLEVEL_OUTOF10: 7
PAINLEVEL_OUTOF10: 0

## 2018-11-03 ASSESSMENT — PAIN DESCRIPTION - PAIN TYPE: TYPE: ACUTE PAIN

## 2018-11-03 ASSESSMENT — PAIN DESCRIPTION - LOCATION: LOCATION: BACK

## 2018-11-03 NOTE — PLAN OF CARE
Problem: Risk for Impaired Skin Integrity  Goal: Tissue integrity - skin and mucous membranes  Structural intactness and normal physiological function of skin and  mucous membranes. Outcome: Ongoing      Problem: Falls - Risk of:  Goal: Will remain free from falls  Will remain free from falls   Outcome: Ongoing  Fall risk assessment completed. Patient instructed to use call light. Bed locked and in lowest position, side rails up 2/4, call light and bedside table within reach, clutter removed, and non-skid footwear on when pt out of bed. Hourly rounds will continue.     Problem: Anxiety:  Goal: Level of anxiety will decrease  Level of anxiety will decrease  Outcome: Ongoing

## 2018-11-03 NOTE — PROGRESS NOTES
(24Hr): Intake/Output Summary (Last 24 hours) at 11/03/18 1229  Last data filed at 11/02/18 1807   Gross per 24 hour   Intake              560 ml   Output                0 ml   Net              560 ml         Physical Examination:        General appearance:  alert, cooperative and no distress  Mental Status:  oriented to person, place and time and normal affect  Lungs:  clear to auscultation bilaterally, normal effort  Heart:  regular rate and rhythm, no murmur  Abdomen:  soft, nontender, nondistended, normal bowel sounds, no masses, hepatomegaly, splenomegaly  Extremities:  no edema, redness, tenderness in the calves  Skin:  no gross lesions, rashes, induration    Assessment:        Primary Problem  Chest pain    Active Hospital Problems    Diagnosis Date Noted    Abnormal stress test [R94.39] 11/03/2018    [facility]rombocytosis [facility] [D47.3] 11/03/2018    Leukemoid reaction [D72.823] 11/02/2018    Generalized anxiety disorder [F41.1] 11/02/2018    DAVID (acute kidney injury) ([facility] [N17.9] 11/02/2018    Prostate cancer ([facility] [C61]     Non Hodgkin's lymphoma ([facility] [C85.90]     COPD (chronic obstructive pulmonary disease) ([facility] [J44.9]     Acute systolic congestive heart failure (HCC) [I50.21]     CAD (coronary artery disease) [I25.10]     Shortness of breath [R06.02]     Chest pain [R07.9] 11/01/2018    Essential hypertension [I10] 11/03/2016       Plan:      - chest pain, abnormal stress test. ECHO unremarkable. A/w further input from cardiology. - Leukemoid reaction, thrombocytosis sec to growth factors: worsened. Oncology following.   - Left >right LE edema:  venous dopplers negative for DVT. - DAVID on CKD2:  ?neupogen related. Improved cr.   - Anxiety disorder: continue xanax BID + Ativan PRN as ordered. - COPD: not with exacerbation clinically. - CAD s/p CABG with noncompliance with f/u.   - restarted acyclovir and dapsone for prophylaxis.    - DVT/GI prophylaxis.        Opal Crook, MD  11/3/2018

## 2018-11-04 VITALS
TEMPERATURE: 97.5 F | RESPIRATION RATE: 20 BRPM | OXYGEN SATURATION: 90 % | DIASTOLIC BLOOD PRESSURE: 48 MMHG | SYSTOLIC BLOOD PRESSURE: 100 MMHG | WEIGHT: 154.98 LBS | BODY MASS INDEX: 28.52 KG/M2 | HEART RATE: 101 BPM | HEIGHT: 62 IN

## 2018-11-04 LAB
ABSOLUTE EOS #: 0 K/UL (ref 0–0.4)
ABSOLUTE IMMATURE GRANULOCYTE: ABNORMAL K/UL (ref 0–0.3)
ABSOLUTE LYMPH #: 0.71 K/UL (ref 1–4.8)
ABSOLUTE MONO #: 0 K/UL (ref 0.2–0.8)
ANION GAP SERPL CALCULATED.3IONS-SCNC: 15 MMOL/L (ref 9–17)
BASOPHILS # BLD: 0 %
BASOPHILS ABSOLUTE: 0 K/UL (ref 0–0.2)
BUN BLDV-MCNC: 33 MG/DL (ref 8–23)
BUN/CREAT BLD: 23 (ref 9–20)
CALCIUM SERPL-MCNC: 8.5 MG/DL (ref 8.6–10.4)
CHLORIDE BLD-SCNC: 97 MMOL/L (ref 98–107)
CO2: 26 MMOL/L (ref 20–31)
CREAT SERPL-MCNC: 1.42 MG/DL (ref 0.7–1.2)
DIFFERENTIAL TYPE: ABNORMAL
EOSINOPHILS RELATIVE PERCENT: 0 % (ref 1–4)
GFR AFRICAN AMERICAN: 58 ML/MIN
GFR NON-AFRICAN AMERICAN: 48 ML/MIN
GFR SERPL CREATININE-BSD FRML MDRD: ABNORMAL ML/MIN/{1.73_M2}
GFR SERPL CREATININE-BSD FRML MDRD: ABNORMAL ML/MIN/{1.73_M2}
GLUCOSE BLD-MCNC: 174 MG/DL (ref 70–99)
HCT VFR BLD CALC: 25.9 % (ref 41–53)
HEMOGLOBIN: 8.7 G/DL (ref 13.5–17.5)
IMMATURE GRANULOCYTES: ABNORMAL %
LYMPHOCYTES # BLD: 1 % (ref 24–44)
MCH RBC QN AUTO: 34.2 PG (ref 26–34)
MCHC RBC AUTO-ENTMCNC: 33.6 G/DL (ref 31–37)
MCV RBC AUTO: 101.7 FL (ref 80–100)
MONOCYTES # BLD: 0 % (ref 1–7)
MORPHOLOGY: ABNORMAL
NRBC AUTOMATED: ABNORMAL PER 100 WBC
PDW BLD-RTO: 18 % (ref 11.5–14.5)
PLATELET # BLD: 369 K/UL (ref 130–400)
PLATELET ESTIMATE: ABNORMAL
PMV BLD AUTO: 9.6 FL (ref 6–12)
POTASSIUM SERPL-SCNC: 4.2 MMOL/L (ref 3.7–5.3)
RBC # BLD: 2.54 M/UL (ref 4.5–5.9)
RBC # BLD: ABNORMAL 10*6/UL
SEG NEUTROPHILS: 99 % (ref 36–66)
SEGMENTED NEUTROPHILS ABSOLUTE COUNT: 69.79 K/UL (ref 1.8–7.7)
SODIUM BLD-SCNC: 138 MMOL/L (ref 135–144)
WBC # BLD: 70.5 K/UL (ref 3.5–11)
WBC # BLD: ABNORMAL 10*3/UL

## 2018-11-04 PROCEDURE — 36415 COLL VENOUS BLD VENIPUNCTURE: CPT

## 2018-11-04 PROCEDURE — 6370000000 HC RX 637 (ALT 250 FOR IP): Performed by: INTERNAL MEDICINE

## 2018-11-04 PROCEDURE — 6360000002 HC RX W HCPCS: Performed by: NURSE PRACTITIONER

## 2018-11-04 PROCEDURE — 85025 COMPLETE CBC W/AUTO DIFF WBC: CPT

## 2018-11-04 PROCEDURE — 99232 SBSQ HOSP IP/OBS MODERATE 35: CPT | Performed by: INTERNAL MEDICINE

## 2018-11-04 PROCEDURE — 6370000000 HC RX 637 (ALT 250 FOR IP): Performed by: NURSE PRACTITIONER

## 2018-11-04 PROCEDURE — 2700000000 HC OXYGEN THERAPY PER DAY

## 2018-11-04 PROCEDURE — 80048 BASIC METABOLIC PNL TOTAL CA: CPT

## 2018-11-04 PROCEDURE — 94640 AIRWAY INHALATION TREATMENT: CPT

## 2018-11-04 PROCEDURE — 94760 N-INVAS EAR/PLS OXIMETRY 1: CPT

## 2018-11-04 PROCEDURE — 2580000003 HC RX 258: Performed by: NURSE PRACTITIONER

## 2018-11-04 PROCEDURE — 6360000002 HC RX W HCPCS: Performed by: INTERNAL MEDICINE

## 2018-11-04 RX ORDER — METOPROLOL SUCCINATE 25 MG/1
25 TABLET, EXTENDED RELEASE ORAL DAILY
Qty: 30 TABLET | Refills: 3 | Status: SHIPPED | OUTPATIENT
Start: 2018-11-05

## 2018-11-04 RX ORDER — LORAZEPAM 2 MG/ML
0.5 INJECTION INTRAMUSCULAR EVERY 6 HOURS PRN
Status: DISCONTINUED | OUTPATIENT
Start: 2018-11-04 | End: 2018-11-04 | Stop reason: HOSPADM

## 2018-11-04 RX ORDER — METOPROLOL SUCCINATE 25 MG/1
25 TABLET, EXTENDED RELEASE ORAL DAILY
Status: DISCONTINUED | OUTPATIENT
Start: 2018-11-04 | End: 2018-11-04 | Stop reason: HOSPADM

## 2018-11-04 RX ORDER — ISOSORBIDE MONONITRATE 30 MG/1
30 TABLET, EXTENDED RELEASE ORAL DAILY
Qty: 30 TABLET | Refills: 3 | Status: SHIPPED | OUTPATIENT
Start: 2018-11-05

## 2018-11-04 RX ORDER — ATORVASTATIN CALCIUM 40 MG/1
40 TABLET, FILM COATED ORAL NIGHTLY
Qty: 30 TABLET | Refills: 3 | Status: SHIPPED | OUTPATIENT
Start: 2018-11-04

## 2018-11-04 RX ADMIN — PANTOPRAZOLE SODIUM 40 MG: 40 TABLET, DELAYED RELEASE ORAL at 05:37

## 2018-11-04 RX ADMIN — ACYCLOVIR 400 MG: 200 CAPSULE ORAL at 09:02

## 2018-11-04 RX ADMIN — TIOTROPIUM BROMIDE 18 MCG: 18 CAPSULE ORAL; RESPIRATORY (INHALATION) at 09:43

## 2018-11-04 RX ADMIN — LORAZEPAM 0.5 MG: 2 INJECTION, SOLUTION INTRAMUSCULAR; INTRAVENOUS at 05:35

## 2018-11-04 RX ADMIN — DOCUSATE SODIUM 100 MG: 100 CAPSULE, LIQUID FILLED ORAL at 09:02

## 2018-11-04 RX ADMIN — FAMOTIDINE 20 MG: 20 TABLET ORAL at 07:04

## 2018-11-04 RX ADMIN — ASPIRIN 325 MG: 325 TABLET, DELAYED RELEASE ORAL at 09:02

## 2018-11-04 RX ADMIN — ALPRAZOLAM 0.5 MG: 0.5 TABLET ORAL at 07:03

## 2018-11-04 RX ADMIN — ISOSORBIDE MONONITRATE 30 MG: 30 TABLET ORAL at 07:03

## 2018-11-04 RX ADMIN — Medication 10 ML: at 09:02

## 2018-11-04 RX ADMIN — DAPSONE 100 MG: 100 TABLET ORAL at 09:02

## 2018-11-04 RX ADMIN — PREDNISONE 50 MG: 20 TABLET ORAL at 09:02

## 2018-11-04 RX ADMIN — METOPROLOL SUCCINATE 25 MG: 25 TABLET, FILM COATED, EXTENDED RELEASE ORAL at 09:02

## 2018-11-04 RX ADMIN — HEPARIN SODIUM 5000 UNITS: 5000 INJECTION INTRAVENOUS; SUBCUTANEOUS at 05:37

## 2018-11-04 RX ADMIN — MOMETASONE FUROATE AND FORMOTEROL FUMARATE DIHYDRATE 2 PUFF: 200; 5 AEROSOL RESPIRATORY (INHALATION) at 09:47

## 2018-11-04 RX ADMIN — GABAPENTIN 300 MG: 300 CAPSULE ORAL at 07:03

## 2018-11-04 RX ADMIN — ALBUTEROL SULFATE 2.5 MG: 2.5 SOLUTION RESPIRATORY (INHALATION) at 05:09

## 2018-11-04 RX ADMIN — IBUPROFEN 600 MG: 200 TABLET, FILM COATED ORAL at 07:03

## 2018-11-04 RX ADMIN — ACETAMINOPHEN 650 MG: 325 TABLET ORAL at 05:44

## 2018-11-04 RX ADMIN — ALLOPURINOL 300 MG: 300 TABLET ORAL at 09:02

## 2018-11-04 ASSESSMENT — PAIN SCALES - GENERAL
PAINLEVEL_OUTOF10: 6
PAINLEVEL_OUTOF10: 8

## 2018-11-04 ASSESSMENT — PAIN DESCRIPTION - PAIN TYPE: TYPE: CHRONIC PAIN

## 2018-11-04 ASSESSMENT — PAIN DESCRIPTION - LOCATION: LOCATION: SHOULDER;BACK

## 2018-11-04 ASSESSMENT — PAIN DESCRIPTION - ORIENTATION: ORIENTATION: LEFT;LOWER

## 2018-11-04 NOTE — PROGRESS NOTES
(24Hr):     Intake/Output Summary (Last 24 hours) at 11/04/18 0813  Last data filed at 11/04/18 5731   Gross per 24 hour   Intake              240 ml   Output                0 ml   Net              240 ml       Labs:    Hematology:  Recent Labs      11/01/18 2132 11/02/18 0446 11/03/18   1110  11/04/18   0707   WBC  50.0*  55.8*  88.1*  70.5*   RBC  3.06*  2.65*  2.88*  2.54*   HGB  10.5*  9.0*  9.9*  8.7*   HCT  30.7*  26.8*  29.1*  25.9*   MCV  100.2*  101.2*  101.2*  101.7*   MCH  34.2*  34.1*  34.6*  34.2*   MCHC  34.1  33.7  34.2  33.6   RDW  15.0*  16.7*  16.9*  18.0*   PLT  482*  369  446*  369   MPV  NOT REPORTED  9.1  8.8  9.6   DDIMER  2.00   --    --    --      Chemistry:  Recent Labs      11/01/18 2132 11/02/18   0130  11/02/18 0446 11/03/18   1110  11/04/18   0707   NA  135   --   139  141  138   K  3.4*   --   3.6*  4.4  4.2   CL  93*   --   98  100  97*   CO2  20   --   21  24  26   GLUCOSE  215*   --   148*  149*  174*   BUN  30*   --   29*  29*  33*   CREATININE  1.68*   --   1.66*  1.37*  1.42*   MG   --    --   2.0   --    --    ANIONGAP  22*   --   20*  17  15   LABGLOM  40*   --   40*  50*  48*   GFRAA  48*   --   49*  >60  58*   CALCIUM  8.9   --   8.6  8.9  8.5*   PROBNP  2,332*   --    --   6,364*   --    TROPONINT  <0.03  <0.03  <0.03   --    --    MYOGLOBIN  66   --    --    --    --      Recent Labs      11/02/18 0446 11/03/18   1110   PROT  5.4*  5.8*   LABALBU  3.3*  3.7   AST  19  17   ALT  10  10   ALKPHOS  70  96   BILITOT  0.67  0.70   CHOL  150   --    HDL  35*   --    LDLCHOLESTEROL  91   --    CHOLHDLRATIO  4.3   --    TRIG  118   --    VLDL  NOT REPORTED   --          No results found for: SPECIAL  No results found for: CULTURE    No results found for: POCPH, PHART, PH, POCPCO2, NWF0QCM, PCO2, POCPO2, PO2ART, PO2, POCHCO3, YDW8ZOY, HCO3, NBEA, PBEA, BEART, BE, THGBART, THB, CJB0JWA, ATKC7DKZ, L0UJLXVT, O2SAT, FIO2    Radiology:    No new radiology

## 2018-11-04 NOTE — PROGRESS NOTES
_                         Today's Date: 11/3/2018  Patient Name: Ya Summers  Date of admission: 11/1/2018  9:22 PM  Patient's age: [de-identified] y.o., 1940  Admission Dx: Chest pain [R07.9]      Requesting Physician: Wilbur Fenton MD    CHIEF COMPLAINT:  dyspnra    SUBJECTIVE:  The patient was seen and examined. Clinically stable. No new events. Less SOB. No fever. No N/V.       BRIEF CASE HISTORY:    The patient is a [de-identified] y.o.  male who is admitted to the hospital for  worsening dyspnea. He was found to be in heart failure. He was given some Lasix with improvement. 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 He was started on RCHOP chemotherapy with 50% dose reduction a few weeks ago. That was generally well tolerated and associated with some reduction in the cervical adenopathy but no improvement in the inguinal lymph nodes. So the dose was escalated to 75% last cycle. He just received the second cycle and received growth factor. The patient has memory loss and unable to give a lot of history. Most information were obtained from the chart. Seems like she has tolerated chemotherapy fairly well. There is to complain of lower extremity swelling. DVT study was done 2 months ago and was essentially negative. He also underwent VQ scan on admission and that was read as low probability for PE  Past Medical History:   has a past medical history of DAVID (acute kidney injury) ([address] CAD (coronary artery disease); Cancer [facility]); CHF (congestive heart failure) ([address] COPD (chronic obstructive pulmonary disease) ([address] Generalized anxiety disorder; Hyperlipidemia; Hypertension; Non Hodgkin's lymphoma ([address] Prostate cancer ([address] Prostate cancer ([address] and Sleep apnea. Past Surgical History:   has a past surgical history that includes Coronary artery bypass graft.    Family History: family history is not on file. Social History:   reports that he quit smoking about 7 years ago. His smoking use included Cigarettes. He has a 55.00 pack-year smoking history. He has never used smokeless tobacco. He reports that he does not drink alcohol or use drugs. Medications:    Prior to Admission medications    Medication Sig Start Date End Date Taking? Authorizing Provider   mometasone-formoterol (DULERA) 100-5 MCG/ACT inhaler Inhale 2 puffs into the lungs 2 times daily   Yes Historical Provider, MD   albuterol sulfate  (90 Base) MCG/ACT inhaler Inhale 2 puffs into the lungs every 6 hours as needed for Wheezing   Yes Historical Provider, MD   famotidine (PEPCID) 20 MG tablet Take 20 mg by mouth 2 times daily   Yes Historical Provider, MD   omeprazole (PRILOSEC) 20 MG delayed release capsule Take 20 mg by mouth Daily   Yes Historical Provider, MD   acyclovir (ZOVIRAX) 400 MG tablet Take 400 mg by mouth 2 times daily   Yes Historical Provider, MD   ibuprofen (ADVIL;MOTRIN) 600 MG tablet Take 600 mg by mouth 3 times daily Take one tablet daily for five days starting the day of neulasta   Yes Historical Provider, MD   senna-docusate (PERICOLACE) 8.6-50 MG per tablet Take 1-2 tablets by mouth 2 times daily as needed for Constipation Take one or two tablets as needed for constipation    Yes Historical Provider, MD   oxyCODONE (ROXICODONE) 5 MG immediate release tablet Take 10-15 mg by mouth every 4 hours as needed for Pain. Take two to three tablets every four hours as needed for pain .    Yes Historical Provider, MD   dapsone 100 MG tablet Take 100 mg by mouth daily Take one tablet daily while on chemotherapy until instructed to stop   Yes Historical Provider, MD   prochlorperazine (COMPAZINE) 10 MG tablet Take 10 mg by mouth every 6 hours as needed   Yes Historical Provider, MD   allopurinol (ZYLOPRIM) 300 MG tablet Take 300 mg by mouth daily Take one tablet daily for 14 days starting 10/25/2018 in the morning   Yes Historical Provider, MD   gabapentin (NEURONTIN) 300 MG capsule Take 300 mg by mouth 2 times daily. May increase to two tablets twice daily after one week starting 10/31/2018 . Yes Historical Provider, MD   predniSONE (DELTASONE) 50 MG tablet Take 50 mg by mouth 2 times daily Take one tablet twice daily with food for five days starting the day after chemo   Yes Historical Provider, MD   Loratadine-Pseudoephedrine (PX ALLERGY RELIEF D, LORATID, PO) Take 10 mg by mouth daily as needed   Yes Historical Provider, MD   docusate sodium (COLACE) 100 MG capsule Take 1 capsule by mouth 2 times daily 9/11/18  Yes ZAINAB Craig - CNP   zolpidem (AMBIEN) 10 MG tablet Take 10 mg by mouth nightly as needed.  . 10/17/15  Yes Historical Provider, MD   potassium chloride (KLOR-CON M20) 20 MEQ extended release tablet Take 20 mEq by mouth daily    Historical Provider, MD   furosemide (LASIX) 40 MG tablet TAKE ONE TABLET BY MOUTH ONCE DAILY 9/19/18   Alyssa Castañeda MD     Current Facility-Administered Medications   Medication Dose Route Frequency Provider Last Rate Last Dose    regadenoson (LEXISCAN) injection 0.4 mg  0.4 mg Intravenous ONCE PRN Lizbeth Tram, APRN - CNP   0.4 mg at 11/03/18 0905    0.9 % sodium chloride infusion 250 mL  250 mL Intravenous Once Lizbeth Tram, APRN - CNP        sodium chloride flush 0.9 % injection 10 mL  10 mL Intravenous PRN Lizbeth Ugalde APRN - CNP        isosorbide mononitrate (IMDUR) extended release tablet 30 mg  30 mg Oral Daily Robbie Herman MD   30 mg at 11/03/18 1244    allopurinol (ZYLOPRIM) tablet 300 mg  300 mg Oral Daily Faustine Boop, APRN - CNP   300 mg at 11/03/18 1019    mometasone-formoterol (DULERA) 200-5 MCG/ACT inhaler 2 puff  2 puff Inhalation BID Faustine Boop, APRN - CNP   2 puff at 11/03/18 1944    dapsone tablet 100 mg  100 mg Oral Daily Faustine Boop, APRN - CNP   100 mg at 11/03/18 1030    famotidine (PEPCID) tablet 20 tablet 650 mg  650 mg Oral Q4H PRN Osito Jesus, APRN - CNP   650 mg at 11/02/18 0936    ondansetron (ZOFRAN-ODT) disintegrating tablet 4 mg  4 mg Oral Q6H PRN Osito Jesus, APRN - CNP        Or    ondansetron [facility]) injection 4 mg  4 mg Intravenous Q6H PRN Osito Jesus, APRN - CNP        albuterol (PROVENTIL) nebulizer solution 2.5 mg  2.5 mg Nebulization Q6H PRN Lucilla Fuchs Orlop, DO        heparin (porcine) injection 5,000 Units  5,000 Units Subcutaneous 3 times per day ZAINAB Salas - CNP   5,000 Units at 11/03/18 2058    ALPRAZolam (XANAX) tablet 0.5 mg  0.5 mg Oral BID Tiana Farr MD   0.5 mg at 11/03/18 2052    ibuprofen (ADVIL;MOTRIN) tablet 600 mg  600 mg Oral TID Ruslan Lucia MD   600 mg at 11/03/18 2051    acyclovir (ZOVIRAX) capsule 400 mg  400 mg Oral BID Lucilla Fuchs Orlop, DO   400 mg at 11/03/18 2052       Allergies:  Patient has no known allergies. REVIEW OF SYSTEMS:      Constitutional:  No fever or chills. No night sweats, no weight loss, poor memory   Eyes:  No eye discharge, double vision, or eye pain   HEENT:  negative for  hearing loss, tinnitus, ear drainage, earaches,  epistaxis,  sore mouth, sore throat, hoarseness and voice change  Respiratory:  negative for  , + cough and dyspnea. Cardiovascular:  negative for  chest pain, + dyspnea, LE swelling. Panic attacks   Gastrointestinal:  negative for nausea, vomiting, diarrhea, constipation, abdominal pain,  Dysphagia, hematemesis and hematochezia  Genitourinary:  negative for frequency, dysuria, nocturia, urinary incontinence,  and hematuria  Integument/Breast:  negative for rash, skin lesions, bruises.   Hematologic/Lymphatic:  negative for easy bruising, bleeding, lymphadenopathy, petechiae and swelling/edema  Allergic/Immunologic:  negative for recurrent infections, urticaria, hay fever, angioedema, anaphylaxis   Endocrine:  negative for heat or cold intolerance, tremor, weight changes, change the last 72 hours. LIVER PROFILE:  Recent Labs      11/02/18   0446  11/03/18   1110   AST  19  17   ALT  10  10   LABALBU  3.3*  3.7               IMPRESSION:    Primary Problem  Chest pain    Active Hospital Problems    Diagnosis Date Noted    Abnormal stress test [R94.39] 11/03/2018    Thrombocytosis ([facility] [D47.3] 11/03/2018    Leukemoid reaction [D72.823] 11/02/2018    Generalized anxiety disorder [F41.1] 11/02/2018    DAVID (acute kidney injury) ([facility] [N17.9] 11/02/2018    Prostate cancer (HCC) [C61]     [facility] [C85.90]     COPD (chronic obstructive pulmonary disease) (HCC) [J44.9]     Acute systolic congestive heart failure (HCC) [I50.21]     CAD (coronary artery disease) [I25.10]     Shortness of breath [R06.02]     Chest pain [R07.9] 11/01/2018    Essential hypertension [I10] 11/03/2016       RECOMMENDATIONS:  1. The patient has history of non-Hodgkin's lymphoma involving multiple areas in the body especially in the pelvic and lower extremity area. 2. He is on chemotherapy and received 1 dose with 50% dose reduction but without significant improvement in his symptoms. The second dose was escalated. He just received the second dose or did not have time to make a significant difference. 3. The patient is on anthracyclines however, cardiac work up is so far negative. 4. Leukocytosis is likely related to growth factors  5. Anemia is related to chemotherapy and it is contributing to her symptoms  6. Possible home 1-2 days. Discussed with patient and Nurse. MD Kassidy Franco MD  Cell: [phone]    This note is created with the assistance of a speech recognition program.  While intending to generate a document that actually reflects the content of the visit, the document can still have some errors including those of syntax and sound a like substitutions which may escape proof reading.   It such instances, actual meaning can be extrapolated by

## 2018-11-04 NOTE — DISCHARGE SUMMARY
and this discharge summary is in conjunction with any daily progress note from day of discharge. Discharge plan:     Disposition: Home    Physician Follow Up: Nori Sibley MD  [facility] [address]  [id number] [place] [address]  [phone]    In 1 week         Requiring Further Evaluation/Follow Up POST HOSPITALIZATION/Incidental Findings:  Follow-up with cardiology for titration of cardiac medications    Diet: cardiac diet    Activity: As tolerated    Instructions to Patient: Take medications as prescribed    Discharge Medications:      Medication List      START taking these medications    aspirin 325 MG EC tablet  Take 1 tablet by mouth daily  Start taking on:  11/5/2018     atorvastatin 40 MG tablet  Commonly known as:  LIPITOR  Take 1 tablet by mouth nightly     isosorbide mononitrate 30 MG extended release tablet  Commonly known as:  IMDUR  Take 1 tablet by mouth daily  Start taking on:  11/5/2018     metoprolol succinate 25 MG extended release tablet  Commonly known as:  TOPROL XL  Take 1 tablet by mouth daily  Start taking on:  11/5/2018        CONTINUE taking these medications    acyclovir 400 MG tablet  Commonly known as:  ZOVIRAX     albuterol sulfate  (90 Base) MCG/ACT inhaler     allopurinol 300 MG tablet  Commonly known as:  ZYLOPRIM     dapsone 100 MG tablet     docusate sodium 100 MG capsule  Commonly known as:  COLACE  Take 1 capsule by mouth 2 times daily     DULERA 100-5 MCG/ACT inhaler  Generic drug:  mometasone-formoterol     famotidine 20 MG tablet  Commonly known as:  PEPCID     furosemide 40 MG tablet  Commonly known as:  LASIX  TAKE ONE TABLET BY MOUTH ONCE DAILY     gabapentin 300 MG capsule  Commonly known as:  NEURONTIN     ibuprofen 600 MG tablet  Commonly known as:  ADVIL;MOTRIN     KLOR-CON M20 20 MEQ extended release tablet  Generic drug:  potassium chloride     omeprazole 20 MG delayed release capsule  Commonly known as:  PRILOSEC     oxyCODONE 5 MG immediate release

## 2018-11-05 ENCOUNTER — TELEPHONE (OUTPATIENT)
Dept: INTERNAL MEDICINE | Age: 78
End: 2018-11-05

## 2018-11-05 NOTE — PROCEDURES
[address]                 [address] [facility], [facility]                              CARDIAC STRESS TEST    PATIENT NAME: Dedrick Pozo                         :        1940  MED REC NO:   [id number]                             ROOM:       [address] [facility] NO:   [de-identified]                           ADMIT DATE: 2018  PROVIDER:     Reena Vidales    DATE OF STUDY:  2018    ATTENDING PROVIDER: Wicho Mills MD    PRIMARY CARE PROVIDER:  Bushra Pham. Sayra Disla MD    PERFORMING PHYSICIAN: Umair Pineda. Basilio Valdes MD    LEXISCAN MYOVIEW STRESS TEST    INDICATION:  Chest pain    100% max predicted heart rate:  140  85% max predicted heart rate:  119  Resting heart rate:  93  Maximum heart rate achieved:  104  Duration:  1.26  Resting BP:  126/74  Peak BP:  126/74  % of Age Predicted Maximum:  74%  METS:  1.0  Medications Given: 0.4 Mg Lexiscan; 0.5 Mg aminophylline; 1 nitro. Reason for Termination:  Medication infusion complete. Heart Rate Response:  Normal.  Blood Pressure Response:  Normal response. Baseline EKG demonstrated:  Sinus rhythm. Stress EKG Changes Demonstrated:  No abnormal change. ECG IMPRESSION:  Negative. COMMENTS:  Correlate with myocardial perfusion imaging. Nuclear medicine report to follow.       Joaquin Vogel    D: 2018 7:13:36       T: 2018 7:15:19     KS/[email or web address]  Job#: [id number]     Doc#: Unknown    CC:  Ladan Durán

## 2018-11-08 ENCOUNTER — TELEPHONE (OUTPATIENT)
Dept: ONCOLOGY | Age: 78
End: 2018-11-08

## 2018-11-14 LAB
EKG ATRIAL RATE: 111 BPM
EKG P AXIS: 67 DEGREES
EKG P-R INTERVAL: 148 MS
EKG Q-T INTERVAL: 284 MS
EKG QRS DURATION: 94 MS
EKG QTC CALCULATION (BAZETT): 386 MS
EKG R AXIS: 46 DEGREES
EKG T AXIS: 147 DEGREES
EKG VENTRICULAR RATE: 111 BPM

## 2019-02-15 ENCOUNTER — TELEPHONE (OUTPATIENT)
Dept: PRIMARY CARE CLINIC | Age: 79
End: 2019-02-15

## 2020-04-20 ENCOUNTER — TELEPHONE (OUTPATIENT)
Dept: OTHER | Facility: CLINIC | Age: 80
End: 2020-04-20

## 2020-04-20 NOTE — TELEPHONE ENCOUNTER
Morena Son was contacted to set up a video visit with Johana Muñoz MD.     Spoke with: left message for patient to call back and schedule a virtual visit    Urban BINGHAM, RN